# Patient Record
Sex: FEMALE | Race: WHITE
[De-identification: names, ages, dates, MRNs, and addresses within clinical notes are randomized per-mention and may not be internally consistent; named-entity substitution may affect disease eponyms.]

---

## 2021-12-06 ENCOUNTER — HOSPITAL ENCOUNTER (OUTPATIENT)
Dept: HOSPITAL 75 - RAD FS | Age: 76
End: 2021-12-06
Attending: FAMILY MEDICINE
Payer: MEDICARE

## 2021-12-06 DIAGNOSIS — M47.26: Primary | ICD-10-CM

## 2021-12-06 PROCEDURE — 72100 X-RAY EXAM L-S SPINE 2/3 VWS: CPT

## 2021-12-06 NOTE — DIAGNOSTIC IMAGING REPORT
Indication: Low back pain.



TIME OF EXAM: 2:31 PM



3 views lumbar spine show normal curvature and alignment.

Vertebral body heights are maintained. No acute compression

fracture seen. There is some generalized degenerative disc

disease with variable disc space narrowing and marginal spurring.

There is lower lumbar facet arthropathy. Atherosclerotic

calcifications in the abdominal aorta are noted.



IMPRESSION: Number spondylosis. No acute bony abnormality is

detected.



Dictated by: 



  Dictated on workstation # [id number]

## 2021-12-20 ENCOUNTER — HOSPITAL ENCOUNTER (OUTPATIENT)
Dept: HOSPITAL 75 - RAD | Age: 76
End: 2021-12-20
Attending: FAMILY MEDICINE
Payer: MEDICARE

## 2021-12-20 DIAGNOSIS — M48.07: ICD-10-CM

## 2021-12-20 DIAGNOSIS — M48.061: ICD-10-CM

## 2021-12-20 DIAGNOSIS — M47.817: ICD-10-CM

## 2021-12-20 DIAGNOSIS — M43.17: ICD-10-CM

## 2021-12-20 DIAGNOSIS — M51.16: ICD-10-CM

## 2021-12-20 DIAGNOSIS — M51.27: ICD-10-CM

## 2021-12-20 DIAGNOSIS — M47.26: Primary | ICD-10-CM

## 2021-12-20 PROCEDURE — 72148 MRI LUMBAR SPINE W/O DYE: CPT

## 2021-12-20 NOTE — DIAGNOSTIC IMAGING REPORT
PROCEDURE: MRI lumbar spine.



TECHNIQUE: Multiplanar, multisequence MRI of the lumbar spine was

performed without contrast.



INDICATION: Back pain with radiculopathy.



FINDINGS: There is minimal anterolisthesis of L5 on S1. The

alignment of the lumbosacral spine is normal. Body heights are

well maintained. Marrow signal is normal. The conus medullaris

and cauda equina are normal.



L1-L2: Disc shows normal contour. No facet or ligamentous

hypertrophy.



L2-L3: Desiccation of the disc with some loss of disc space

height. There is broad-based disc protrusion with facet and

ligamentous hypertrophy causing moderate encroachment upon the

foramen bilaterally. No central canal stenosis.



L3-L4: There is broad-based disc bulge with moderate facet and

ligamentous hypertrophy causing moderate bilateral foraminal

encroachment without central stenosis.



L4-L5: There is desiccation of the disc with minimal disc bulge.

There is considerable facet and ligamentous hypertrophy causing

moderate trefoil stenosis and encroachment upon the lateral

recesses. The residual AP central canal dimension is

approximately 6 mm.



L5-S1: Minimal anterolisthesis of L5 on S1 with very mild disc

bulge. Mild facet and ligamentous hypertrophy causing mild

foraminal encroachment bilaterally without central canal

stenosis. The paraspinal soft tissues appear normal.



IMPRESSION:

1. Multilevel degenerative disc and facet disease causing

mild-to-moderate encroachment as described above.



Dictated by: 



  Dictated on workstation # [id number]

## 2023-02-28 ENCOUNTER — HOSPITAL ENCOUNTER (OUTPATIENT)
Dept: HOSPITAL 75 - ER FS | Age: 78
Setting detail: OBSERVATION
LOS: 2 days | Discharge: ANOTHER HOSPITAL | End: 2023-03-02
Attending: INTERNAL MEDICINE | Admitting: FAMILY MEDICINE
Payer: MEDICARE

## 2023-02-28 VITALS — HEIGHT: 62.99 IN | BODY MASS INDEX: 28.52 KG/M2 | WEIGHT: 160.94 LBS

## 2023-02-28 VITALS — SYSTOLIC BLOOD PRESSURE: 159 MMHG | DIASTOLIC BLOOD PRESSURE: 80 MMHG

## 2023-02-28 VITALS — SYSTOLIC BLOOD PRESSURE: 134 MMHG | DIASTOLIC BLOOD PRESSURE: 59 MMHG

## 2023-02-28 VITALS — SYSTOLIC BLOOD PRESSURE: 139 MMHG | DIASTOLIC BLOOD PRESSURE: 77 MMHG

## 2023-02-28 VITALS — SYSTOLIC BLOOD PRESSURE: 143 MMHG | DIASTOLIC BLOOD PRESSURE: 70 MMHG

## 2023-02-28 VITALS — DIASTOLIC BLOOD PRESSURE: 84 MMHG | SYSTOLIC BLOOD PRESSURE: 139 MMHG

## 2023-02-28 VITALS — DIASTOLIC BLOOD PRESSURE: 81 MMHG | SYSTOLIC BLOOD PRESSURE: 151 MMHG

## 2023-02-28 VITALS — SYSTOLIC BLOOD PRESSURE: 143 MMHG | DIASTOLIC BLOOD PRESSURE: 90 MMHG

## 2023-02-28 VITALS — SYSTOLIC BLOOD PRESSURE: 147 MMHG | DIASTOLIC BLOOD PRESSURE: 68 MMHG

## 2023-02-28 DIAGNOSIS — E78.5: ICD-10-CM

## 2023-02-28 DIAGNOSIS — Z79.899: ICD-10-CM

## 2023-02-28 DIAGNOSIS — I10: ICD-10-CM

## 2023-02-28 DIAGNOSIS — E87.29: ICD-10-CM

## 2023-02-28 DIAGNOSIS — C92.00: Primary | ICD-10-CM

## 2023-02-28 DIAGNOSIS — E11.9: ICD-10-CM

## 2023-02-28 DIAGNOSIS — E87.5: ICD-10-CM

## 2023-02-28 DIAGNOSIS — G47.33: ICD-10-CM

## 2023-02-28 DIAGNOSIS — G45.9: ICD-10-CM

## 2023-02-28 DIAGNOSIS — N39.0: ICD-10-CM

## 2023-02-28 DIAGNOSIS — D69.6: ICD-10-CM

## 2023-02-28 DIAGNOSIS — I26.99: ICD-10-CM

## 2023-02-28 DIAGNOSIS — R77.8: ICD-10-CM

## 2023-02-28 DIAGNOSIS — E04.2: ICD-10-CM

## 2023-02-28 LAB
ALBUMIN SERPL-MCNC: 4.5 GM/DL (ref 3.2–4.5)
ALP SERPL-CCNC: 82 U/L (ref 40–136)
ALT SERPL-CCNC: 12 U/L (ref 0–55)
APTT BLD: 28 SEC (ref 24–35)
APTT PPP: YELLOW S
BACTERIA #/AREA URNS HPF: (no result) /HPF
BASOPHILS # BLD AUTO: 0.1 10^3/UL (ref 0–0.1)
BASOPHILS NFR BLD AUTO: 0 % (ref 0–10)
BASOPHILS NFR BLD MANUAL: 1 %
BILIRUB SERPL-MCNC: 0.3 MG/DL (ref 0.1–1)
BILIRUB UR QL STRIP: NEGATIVE
BUN/CREAT SERPL: 19
CALCIUM SERPL-MCNC: 9.5 MG/DL (ref 8.5–10.1)
CHLORIDE SERPL-SCNC: 102 MMOL/L (ref 98–107)
CO2 SERPL-SCNC: 23 MMOL/L (ref 21–32)
CREAT SERPL-MCNC: 1.27 MG/DL (ref 0.6–1.3)
D DIMER PPP FEU-MCNC: > 20 UG/ML (ref 0–0.49)
EOSINOPHIL # BLD AUTO: 0 10^3/UL (ref 0–0.3)
EOSINOPHIL NFR BLD AUTO: 0 % (ref 0–10)
EOSINOPHIL NFR BLD MANUAL: 0 %
FIBRINOGEN PPP-MCNC: (no result) MG/DL
GFR SERPLBLD BASED ON 1.73 SQ M-ARVRAT: 43 ML/MIN
GLUCOSE SERPL-MCNC: 152 MG/DL (ref 70–105)
GLUCOSE UR STRIP-MCNC: NEGATIVE MG/DL
HCT VFR BLD CALC: 37 % (ref 35–52)
HGB BLD-MCNC: 12.3 G/DL (ref 11.5–16)
INR PPP: 1.1 (ref 0.8–1.4)
KETONES UR QL STRIP: NEGATIVE
LEUKOCYTE ESTERASE UR QL STRIP: (no result)
LYMPHOCYTES # BLD AUTO: 11.6 10^3/UL (ref 1–4)
LYMPHOCYTES NFR BLD AUTO: 48 % (ref 12–44)
MAGNESIUM SERPL-MCNC: 1.7 MG/DL (ref 1.6–2.4)
MANUAL DIFFERENTIAL PERFORMED BLD QL: YES
MCH RBC QN AUTO: 28 PG (ref 25–34)
MCHC RBC AUTO-ENTMCNC: 33 G/DL (ref 32–36)
MCV RBC AUTO: 86 FL (ref 80–99)
METAMYELOCYTES NFR BLD: 1 %
MONOCYTES # BLD AUTO: 10.7 10^3/UL (ref 0–1)
MONOCYTES NFR BLD AUTO: 44 % (ref 0–12)
MONOCYTES NFR BLD: 15 %
NEUTROPHILS # BLD AUTO: 1.3 10^3/UL (ref 1.8–7.8)
NEUTROPHILS NFR BLD AUTO: 5 % (ref 42–75)
NEUTS BAND NFR BLD MANUAL: 4 %
NEUTS BAND NFR BLD: 1 %
NITRITE UR QL STRIP: POSITIVE
NRBC BLD MANUAL-RTO: 1 %
PH UR STRIP: 5.5 [PH] (ref 5–9)
PLATELET # BLD: 62 10^3/UL (ref 130–400)
PMV BLD AUTO: 10.3 FL (ref 9–12.2)
POTASSIUM SERPL-SCNC: 3.4 MMOL/L (ref 3.6–5)
PROT SERPL-MCNC: 7.8 GM/DL (ref 6.4–8.2)
PROT UR QL STRIP: (no result)
PROTHROMBIN TIME: 14.7 SEC (ref 12.2–14.7)
RBC #/AREA URNS HPF: (no result) /HPF
SODIUM SERPL-SCNC: 141 MMOL/L (ref 135–145)
SP GR UR STRIP: 1.01 (ref 1.02–1.02)
SQUAMOUS #/AREA URNS HPF: (no result) /HPF
VARIANT LYMPHS NFR BLD MANUAL: 70 %
VARIANT LYMPHS NFR BLD MANUAL: 8 %
WBC # BLD AUTO: 24.1 10^3/UL (ref 4.3–11)
WBC #/AREA URNS HPF: (no result) /HPF

## 2023-02-28 PROCEDURE — 85379 FIBRIN DEGRADATION QUANT: CPT

## 2023-02-28 PROCEDURE — 85610 PROTHROMBIN TIME: CPT

## 2023-02-28 PROCEDURE — 87636 SARSCOV2 & INF A&B AMP PRB: CPT

## 2023-02-28 PROCEDURE — 85730 THROMBOPLASTIN TIME PARTIAL: CPT

## 2023-02-28 PROCEDURE — 82947 ASSAY GLUCOSE BLOOD QUANT: CPT

## 2023-02-28 PROCEDURE — 93970 EXTREMITY STUDY: CPT

## 2023-02-28 PROCEDURE — 80053 COMPREHEN METABOLIC PANEL: CPT

## 2023-02-28 PROCEDURE — 80048 BASIC METABOLIC PNL TOTAL CA: CPT

## 2023-02-28 PROCEDURE — 83735 ASSAY OF MAGNESIUM: CPT

## 2023-02-28 PROCEDURE — 96374 THER/PROPH/DIAG INJ IV PUSH: CPT

## 2023-02-28 PROCEDURE — 70450 CT HEAD/BRAIN W/O DYE: CPT

## 2023-02-28 PROCEDURE — 85045 AUTOMATED RETICULOCYTE COUNT: CPT

## 2023-02-28 PROCEDURE — 94760 N-INVAS EAR/PLS OXIMETRY 1: CPT

## 2023-02-28 PROCEDURE — 83605 ASSAY OF LACTIC ACID: CPT

## 2023-02-28 PROCEDURE — 85025 COMPLETE CBC W/AUTO DIFF WBC: CPT

## 2023-02-28 PROCEDURE — 99284 EMERGENCY DEPT VISIT MOD MDM: CPT

## 2023-02-28 PROCEDURE — 96372 THER/PROPH/DIAG INJ SC/IM: CPT

## 2023-02-28 PROCEDURE — 96375 TX/PRO/DX INJ NEW DRUG ADDON: CPT

## 2023-02-28 PROCEDURE — 85027 COMPLETE CBC AUTOMATED: CPT

## 2023-02-28 PROCEDURE — 87186 SC STD MICRODIL/AGAR DIL: CPT

## 2023-02-28 PROCEDURE — 85007 BL SMEAR W/DIFF WBC COUNT: CPT

## 2023-02-28 PROCEDURE — 71275 CT ANGIOGRAPHY CHEST: CPT

## 2023-02-28 PROCEDURE — 70496 CT ANGIOGRAPHY HEAD: CPT

## 2023-02-28 PROCEDURE — 87088 URINE BACTERIA CULTURE: CPT

## 2023-02-28 PROCEDURE — 93306 TTE W/DOPPLER COMPLETE: CPT

## 2023-02-28 PROCEDURE — 36415 COLL VENOUS BLD VENIPUNCTURE: CPT

## 2023-02-28 PROCEDURE — 70498 CT ANGIOGRAPHY NECK: CPT

## 2023-02-28 PROCEDURE — 96376 TX/PRO/DX INJ SAME DRUG ADON: CPT

## 2023-02-28 PROCEDURE — 84550 ASSAY OF BLOOD/URIC ACID: CPT

## 2023-02-28 PROCEDURE — 87077 CULTURE AEROBIC IDENTIFY: CPT

## 2023-02-28 PROCEDURE — 71045 X-RAY EXAM CHEST 1 VIEW: CPT

## 2023-02-28 PROCEDURE — 81000 URINALYSIS NONAUTO W/SCOPE: CPT

## 2023-02-28 PROCEDURE — 85055 RETICULATED PLATELET ASSAY: CPT

## 2023-02-28 PROCEDURE — 84484 ASSAY OF TROPONIN QUANT: CPT

## 2023-02-28 PROCEDURE — 93005 ELECTROCARDIOGRAM TRACING: CPT

## 2023-02-28 RX ADMIN — Medication SCH ML: at 22:41

## 2023-02-28 NOTE — DIAGNOSTIC IMAGING REPORT
PROCEDURE: CT angiography of the head and CT angiography of the

neck with and without contrast.



TECHNIQUE: Contiguous noncontrast images were obtained from the

skull base through the vertex. After intravenous contrast

administration, helical CT angiography of the neck was performed.

Source data was reformatted into 3D MIP projections. Delayed post

contrast acquisition was also obtained. Auto Exposure Controls

were utilized during the CT exam to meet ALARA standards for

radiation dose reduction. 



INDICATION: Right-sided weakness.



CTA NECK:



There is a three-vessel branching pattern to the aortic arch. The

right and left common carotid arteries are widely patent. No

focal stenosis is identified. Both carotid bifurcations are

unremarkable and show no focal stenosis. The right and left

internal carotid arteries appear to be widely patent. There is

some calcified plaque at the carotid siphons bilaterally. The

left vertebral artery is dominant. Both vertebral arteries are

widely patent.



CTA HEAD:



The basilar artery is widely patent. The right posterior cerebral

artery is hypoplastic consistent with fetal origin. The right

posterior communicating artery feeds the right PCA. The left PCA

is widely patent. The right and left anterior cerebral arteries

are widely patent. The M1 and M2 branches of the right and left

middle cervical arteries appear to be widely patent. No

thromboembolism is identified. No large vessel occlusion is

detected.



Note is made of multiple low-attenuation nodules throughout both

lobes of the thyroid gland.



IMPRESSION:

1. Essentially unremarkable CT angiogram of the head and neck. No

thromboembolism or large vessel occlusion is identified. 

2. Bilateral thyroid nodules. This could be evaluated with

ultrasound on a nonemergent basis if not already performed.



Dictated by: 



  Dictated on workstation # [id number]

## 2023-02-28 NOTE — ED NEUROLOGICAL PROBLEM
General


Stated Complaint:  VISION DISTURBANCE/RIGHT HAND WEAKNESS





History of Present Illness


Date Seen by Provider:  2023


Time Seen by Provider:  15:31


Initial Comments


71-year-old female with PMH of DM 2/HTN/chronic UTIs/urinary incontinence, is 

here with complaints of right upper extremity weakness which occurred earlier 

today.  Patient's symptoms have resolved since then but she is not feeling well.

 Patient cannot explain how she feels except to say that she does not feel well 

and has been having shortness of breath which is worsened on exertion, and 

worsening over the past week and a half.  Patient noticed that she had urinary 

retention today.  Patient also complains that she has had left upper extremity 

weakness for the past week and a half, and it is not a result of any injuries or

falls.  Denies headache, blurry vision, chest pain, palpitations, abdominal 

pain, fever and chills.





Allergies and Home Medications


Allergies


Coded Allergies:  


     No Known Drug Allergies (Unverified , 23)





Patient Home Medication List


Home Medication List Reviewed:  Yes





Review of Systems


Review of Systems


Constitutional:  no symptoms reported


Eyes:  No Symptoms Reported


Ears, Nose, Mouth, Throat:  no symptoms reported


Respiratory:  no symptoms reported


Cardiovascular:  no symptoms reported


Gastrointestinal:  no symptoms reported


Genitourinary:  see HPI


Musculoskeletal:  no symptoms reported


Skin:  no symptoms reported


Psychiatric/Neurological:  See HPI, Numbness, Weakness


Endocrine:  No Symptoms Reported


Hematologic/Lymphatic:  No Symptoms Reported





Physical Exam


Vital Signs





Vital Signs - First Documented








 23





 15:30


 


Temp 36.5


 


Pulse 93


 


Resp 21


 


B/P (MAP) 200/90 (126)


 


Pulse Ox 96


 


O2 Delivery Room Air





Capillary Refill :


Height, Weight, BMI


Height: '"


Weight: lbs. oz. kg;  BMI


Method:


General Appearance:  WD/WN, no apparent distress


HEENT:  PERRL/EOMI


Neck:  non-tender, full range of motion, supple, normal inspection


Respiratory:  lungs clear, normal breath sounds


Cardiovascular:  regular rate, rhythm, no edema


Gastrointestinal:  normal bowel sounds, non tender, soft


Back:  normal inspection, no CVA tenderness, no vertebral tenderness


Extremities:  normal range of motion, non-tender, normal inspection, no pedal 

edema, no calf tenderness


Neurologic/Psychiatric:  CNs II-XII nml as tested, no motor/sensory deficits, 

alert, normal mood/affect, oriented x 3


Crainal Nerves:  normal hearing, normal speech, PERRL


Coordination/Gait:  normal finger to nose, normal gait


Motor/Sensory:  no motor deficit, no sensory deficit, no pronator drift, 

negative Babinski's sign


Reflexes:  4+ Bicep (R), 4+ Bicep (L), 4+ Tricep (L), 4+ Knee (R), 4+ Knee (L), 

4+ Ankle (R), 4+ Ankle (L)


Skin:  normal color


Lymphatic:  no adenopathy





Stroke


NIH Stroke Scale Assessment





   Select: Initial Level of Consciousness: 0=Alert (0), Level of 

   Consciousness-Questions: 0=Answers both month/age (0), LOC Commands: 

   0=Performs both tasks (0), Visual Fields: 0=No visual loss (0), Facial 

   Movement (Facial Paresis): 0=Normal symmetrical mnt (0), Motor Function-Arms 

   Right: 0=No drift (0), Motor Function-Arms Left: 0=No drift (0), Motor 

   Function-Legs Right: 0=No drift (0), Motor Function-Legs Left: 0=No drift 

   (0), Limb Ataxia: 0=Absent (0), Sensory: 0=Normal:no loss (0), Best Language:

   0=No aphasia (0), Dysarthria: 0=Normal (0), Extinction & Inattention: 0=No 

   abnormality (0), Total: 0





Focused Exam


Lactate Level


23 15:50: Lactic Acid Level 0.93





Lactic Acid Level





Laboratory Tests








Test


 23


15:50


 


Lactic Acid Level


 0.93 MMOL/L


(0.50-2.00)











Progress/Results/Core Measures


Results/Orders


Lab Results





Laboratory Tests








Test


 23


15:35 23


15:36 23


15:50 23


16:00 Range/Units


 


 


White Blood Count


 24.1 H


 


 


 


 4.3-11.0


10^3/uL


 


Red Blood Count


 4.37 


 


 


 


 3.80-5.11


10^6/uL


 


Hemoglobin 12.3     11.5-16.0  g/dL


 


Hematocrit 37     35-52  %


 


Mean Corpuscular Volume 86     80-99  fL


 


Mean Corpuscular Hemoglobin 28     25-34  pg


 


Mean Corpuscular Hemoglobin


Concent 33 


 


 


 


 32-36  g/dL





 


Red Cell Distribution Width 14.9 H    10.0-14.5  %


 


Platelet Count


 62 L


 


 


 


 130-400


10^3/uL


 


Mean Platelet Volume 10.3     9.0-12.2  fL


 


Immature Granulocyte % (Auto) 2      %


 


Neutrophils (%) (Auto) 5 L    42-75  %


 


Lymphocytes (%) (Auto) 48 H    12-44  %


 


Monocytes (%) (Auto) 44 H    0-12  %


 


Eosinophils (%) (Auto) 0     0-10  %


 


Basophils (%) (Auto) 0     0-10  %


 


Neutrophils # (Auto)


 1.3 L


 


 


 


 1.8-7.8


10^3/uL


 


Lymphocytes # (Auto)


 11.6 H


 


 


 


 1.0-4.0


10^3/uL


 


Monocytes # (Auto)


 10.7 H


 


 


 


 0.0-1.0


10^3/uL


 


Eosinophils # (Auto)


 0.0 


 


 


 


 0.0-0.3


10^3/uL


 


Basophils # (Auto)


 0.1 


 


 


 


 0.0-0.1


10^3/uL


 


Immature Granulocyte # (Auto)


 0.5 H


 


 


 


 0.0-0.1


10^3/uL


 


Neutrophils % (Manual) 4      %


 


Lymphocytes % (Manual) 70      %


 


Monocytes % (Manual) 15      %


 


Eosinophils % (Manual) 0      %


 


Basophils % (Manual) 1      %


 


Metamyelocytes % 1      %


 


Band Neutrophils 1      %


 


Nucleated Red Blood Cells 1      


 


Atypical Lymphocytes 8      %


 


Prothrombin Time 14.7     12.2-14.7  SEC


 


INR Comment 1.1     0.8-1.4  


 


Activated Partial


Thromboplast Time 28 


 


 


 


 24-35  SEC





 


D-Dimer


 > 20.00 H


 


 


 


 0.00-0.49


UG/ML


 


Sodium Level 141     135-145  MMOL/L


 


Potassium Level 3.4 L    3.6-5.0  MMOL/L


 


Chloride Level 102       MMOL/L


 


Carbon Dioxide Level 23     21-32  MMOL/L


 


Anion Gap 16 H    5-14  MMOL/L


 


Blood Urea Nitrogen 24 H    7-18  MG/DL


 


Creatinine


 1.27 


 


 


 


 0.60-1.30


MG/DL


 


Estimat Glomerular Filtration


Rate 43 


 


 


 


  





 


BUN/Creatinine Ratio 19      


 


Glucose Level 152 H      MG/DL


 


Calcium Level 9.5     8.5-10.1  MG/DL


 


Corrected Calcium 9.1     8.5-10.1  MG/DL


 


Magnesium Level 1.7     1.6-2.4  MG/DL


 


Total Bilirubin 0.3     0.1-1.0  MG/DL


 


Aspartate Amino Transf


(AST/SGOT) 22 


 


 


 


 5-34  U/L





 


Alanine Aminotransferase


(ALT/SGPT) 12 


 


 


 


 0-55  U/L





 


Alkaline Phosphatase 82       U/L


 


Troponin I 0.72 *H    <0.30  NG/ML


 


Total Protein 7.8     6.4-8.2  GM/DL


 


Albumin 4.5     3.2-4.5  GM/DL


 


Glucometer  140 H     MG/DL


 


Lactic Acid Level


 


 


 0.93 


 


 0.50-2.00


MMOL/L


 


Influenza Type A (RT-PCR)    Not Detected  Not Detecte  


 


Influenza Type B (RT-PCR)    Not Detected  Not Detecte  


 


SARS-CoV-2 RNA (RT-PCR)    Not Detected  Not Detecte  


 


Test


 23


17:05 23


17:55 


 


 Range/Units


 


 


Urine Color YELLOW      


 


Urine Clarity SL CLOUDY      


 


Urine pH 5.5     5-9  


 


Urine Specific Gravity 1.010 L    1.016-1.022  


 


Urine Protein 1+ H    NEGATIVE  


 


Urine Glucose (UA) NEGATIVE     NEGATIVE  


 


Urine Ketones NEGATIVE     NEGATIVE  


 


Urine Nitrite POSITIVE H    NEGATIVE  


 


Urine Bilirubin NEGATIVE     NEGATIVE  


 


Urine Urobilinogen 0.2     < = 1.0  MG/DL


 


Urine Leukocyte Esterase TRACE H    NEGATIVE  


 


Urine RBC (Auto) 1+ H    NEGATIVE  


 


Urine RBC NONE      /HPF


 


Urine WBC 2-5      /HPF


 


Urine Squamous Epithelial


Cells 5-10 


 


 


 


  /HPF





 


Urine Crystals NONE      /LPF


 


Urine Bacteria MODERATE H     /HPF


 


Urine Casts NONE      /LPF


 


Urine Mucus NEGATIVE      /LPF


 


Urine Culture Indicated YES      


 


Troponin I  1.04 *H   <0.30  NG/ML








My Orders





Orders - JULES REHMAN MD


Ct Head Wo-R/O Stroke (23 15:32)


Chest 1 View Ap/Pa Only (23 15:33)


Cbc With Automated Diff (23 15:40)


Comprehensive Metabolic Panel (23 15:40)


Fibrin Degradation Products (23 15:40)


Lactic Acid Analyzer (23 15:40)


Magnesium (23 15:40)


Protime With Inr (23 15:40)


Partial Thromboplastin Time (23 15:40)


Ua Culture If Indicated (23 15:40)


Troponin I Fs (23 15:40)


Covid 19 Inhouse Test (23 15:42)


Influenza A And B By Pcr (23 15:42)


Manual Differential (23 15:35)


Continuous Ekg Monitoring (23 16:12)


Ekg Tracing (23 16:12)


Ct Angio Head/Neck (23 16:13)


Iohexol Injection (Omnipaque 350 Mg/Ml 1 (23 16:45)


Received Contrast (Hold Metformin- Contr (23 16:45)


Ns (Ivpb) (Sodium Chloride 0.9% Ivpb Bag (23 16:45)


Ct Angio Chest W (23 17:04)


Troponin I Fs (23 17:05)


Ekg Tracing (23 17:05)


Iohexol Injection (Omnipaque 350 Mg/Ml 1 (23 17:15)


Received Contrast (Hold Metformin- Contr (23 17:15)


Ns (Ivpb) (Sodium Chloride 0.9% Ivpb Bag (23 17:15)


Ed Iv/Invasive Line Start (23 17:22)


Ns Iv 1000 Ml (Sodium Chloride 0.9%) (23 17:30)


Urine Culture (23 17:05)


Ceftriaxone 1 Gm Pre-Mix (Rocephin 1 Gm (23 17:34)


Heparin (Bolus Per Protocol) (Heparin (B (23 18:15)


Enoxaparin Injection (Lovenox Injection) (23 18:12)





Medications Given in ED





Current Medications








 Medications  Dose


 Ordered  Sig/Krishna


 Route  Start Time


 Stop Time Status Last Admin


Dose Admin


 


 Iohexol  100 ml  ONCE  ONCE


 IV  23 16:45


 23 16:46 DC 23 16:53


75 ML


 


 Iohexol  100 ml  ONCE  ONCE


 IV  23 17:15


 23 17:32 DC 23 17:42


80 ML


 


 Sodium Chloride  100 ml  ONCE  ONCE


 IV  23 16:45


 23 16:46 DC 23 16:53


100 ML


 


 Sodium Chloride  100 ml  ONCE  ONCE


 IV  23 17:15


 2/28/23 17:32 DC 23 17:41


100 ML








Vital Signs/I&O











 23





 15:30


 


Temp 36.5


 


Pulse 93


 


Resp 21


 


B/P (MAP) 200/90 (126)


 


Pulse Ox 96


 


O2 Delivery Room Air











Progress


Progress Note :  


Progress Note


1. STROKE RULE OUT:


- CT HEAD: no acute findings


- CTA HEAD & NECK: No acute findings


- Labs: see chart and note


-Symptoms likely due to UTI


2. BILATERAL PULMONARY EMBOLISM


- Troponin: 0.72, 2nd tropis 1.04; likely due to PE and demand ischemia. Pt did 

not have chest pain at any time.


- EKG x2: non-ischemic


- Platelet count is 62, pt is not on any blood thinners at home


- D-dimer: >20


- CTA CHEST: Bilateral pulmonary embolism


-Patient's vitals are stable in the ER and she is satting at 97% on room air 

without any need for oxygen.


-Discussed with hospitalist, Dr. Dias, and accepted for observation admission 

to cardiac stepdown.  Discussed anticoagulation and since platelet count is 62, 

will start patient on treatment dose of Lovenox


3. ACUTE CYSTITIS WITH HEMATURIA :


- UA is positive for nitrites and leukocyte esterase and RBCs


-WBC is elevated at 24


- Lactic Acid level was normal indicating normal tissue perfusion.


-Ceftriaxone 1 g IV stat


Initial ECG Impression Date:  2023


Initial ECG Impression Time:  16:15


Initial ECG Rate:  86


Initial ECG Rhythm:  Normal Sinus


Initial ECG Intervals:  Normal


Initial ECG Impression:  Nonspecific Changes





Diagnostic Imaging





   Diagonstic Imaging:  CT


   Plain Films/CT/US/NM/MRI:  chest, head


Comments


                 [facility] [place], [place]





NAME:   AD KRUEGER


[facility] REC#:   [id number]


ACCOUNT#:   [id number]


PT STATUS:   REG ER


:   1945


PHYSICIAN:   JULES REHMAN MD


ADMIT DATE:   23/ER FS


                                   ***Draft***


Date of Exam:23





CT ANGIO HEAD/NECK








PROCEDURE: CT angiography of the head and CT angiography of the


neck with and without contrast.





TECHNIQUE: Contiguous noncontrast images were obtained from the


skull base through the vertex. After intravenous contrast


administration, helical CT angiography of the neck was performed.


Source data was reformatted into 3D MIP projections. Delayed post


contrast acquisition was also obtained. Auto Exposure Controls


were utilized during the CT exam to meet ALARA standards for


radiation dose reduction. 





INDICATION: Right-sided weakness.





CTA NECK:





There is a three-vessel branching pattern to the aortic arch. The


right and left common carotid arteries are widely patent. No


focal stenosis is identified. Both carotid bifurcations are


unremarkable and show no focal stenosis. The right and left


internal carotid arteries appear to be widely patent. There is


some calcified plaque at the carotid siphons bilaterally. The


left vertebral artery is dominant. Both vertebral arteries are


widely patent.





CTA HEAD:





The basilar artery is widely patent. The right posterior cerebral


artery is hypoplastic consistent with fetal origin. The right


posterior communicating artery feeds the right PCA. The left PCA


is widely patent. The right and left anterior cerebral arteries


are widely patent. The M1 and M2 branches of the right and left


middle cervical arteries appear to be widely patent. No


thromboembolism is identified. No large vessel occlusion is


detected.





Note is made of multiple low-attenuation nodules throughout both


lobes of the thyroid gland.





IMPRESSION:


1. Essentially unremarkable CT angiogram of the head and neck. No


thromboembolism or large vessel occlusion is identified. 


2. Bilateral thyroid nodules. This could be evaluated with


ultrasound on a nonemergent basis if not already performed.





  Dictated on workstation # [id number]








Dict:   235


Trans:   23


[facility] [id number]





Interpreted by:     POONAM ROMAN MD


Electronically signed by:  


[facility] [place], [place]





NAME:   AD KRUEGER


[facility] REC#:   [id number]


ACCOUNT#:   [id number]


PT STATUS:   REG ER


:   1945


PHYSICIAN:   JULES REHMAN MD


ADMIT DATE:   23/ER FS


                                   ***Draft***


Date of Exam:23





CT ANGIO CHEST W








PROCEDURE: CT angiography of the chest with contrast.





TECHNIQUE: Multiple contiguous axial images were obtained through


the chest after uneventful bolus administration of intravenous


contrast. 3D reconstructed CTA MIP acquisitions were also


performed. Auto Exposure Controls were utilized during the CT


exam to meet ALARA standards for radiation dose reduction. 





INDICATION: Elevated D-dimer.





No prior studies are available for comparison.





FINDINGS: Findings are positive for pulmonary emboli. There are


some filling defects involving a right upper lobe lobar pulmonary


arterial branch as well as right lower lobe lobar and segmental


branches. There is a tiny filling defect in a left lower lobe


segmental branch. Upper lobes are unremarkable. Overall clot


burden is very small. No definite findings of right heart failure


are detected. Thoracic aorta is normal in caliber. There is no


dissection. There is no pericardial or pleural fluid identified.


Lungs appear to be clear. No infiltrates are seen. There is no


nodule or mass. The upper abdomen does show low-attenuation


within the right lobe of the liver, suggestive of a cyst.





IMPRESSION:


1. Findings consistent with bilateral pulmonary emboli. Overall


clot burden is very small. There is no evidence of right heart


failure.





  Dictated on workstation # [id number]








Dict:   23 173


Trans:   23


 [id number]





Interpreted by:     POONAM ROMAN MD


Electronically signed by:  





                 [facility] [place], [place]





NAME:   AD KRUEGER [facility] REC#:   [id number]


ACCOUNT#:   [id number]


PT STATUS:   REG ER


:   1945


PHYSICIAN:   JULES REHMAN MD


ADMIT DATE:   23/ER FS


                                  ***Signed***


Date of Exam:23





CHEST 1 VIEW AP/PA ONLY








INDICATION: Right upper extremity weakness





COMPARISON: None available. 





TECHNIQUE: Single radiograph of the chest dated 2023.





FINDINGS:  The cardiac silhouette is within normal limits in


size. No significant pulmonary vascular congestion. Senescent


changes of the lungs without focal pulmonary opacity. No


significant pleural effusion. No pneumothorax. No acute osseous


abnormality.





IMPRESSION: Senescent changes of the lungs without superimposed


acute cardiopulmonary abnormality.





Dictated by: 





  Dictated on workstation # [id number]








Dict:   23 1620


Trans:   23


[facility] [id number]





Interpreted by:     VIOLETTE KHOURY MD


Electronically signed by: VIOLETTE KHOURY MD 23


                 [facility] [place], [place]





NAME:   AD KRUEGER [facility] REC#:   [id number]


ACCOUNT#:   [id number]


PT STATUS:   REG ER


:   1945


PHYSICIAN:   JULES REHMAN MD


ADMIT DATE:   23/ER FS


                                   ***Draft***


Date of Exam:23





CT HEAD WO-R/O STROKE








PROCEDURE: CT head wo r/o stroke.





TECHNIQUE: Multiple contiguous axial images were obtained through


the brain without the use of intravenous contrast. Auto Exposure


Controls were utilized during the CT exam to meet ALARA standards


for radiation dose reduction.





INDICATION: Blurred vision and weakness.





COMPARISON: No prior studies are available for comparison.





FINDINGS: Ventricles and sulci are within normal limits. No


sulcal effacement or midline shift is identified. No acute


intra-axial or extra-axial hemorrhage is detected. Cisterns are


patent. Visualized paranasal sinuses are clear.





IMPRESSION: No acute intracranial process is detected.





Results were discussed with Dr. Rehman at 04:03 p.m.





  Dictated on workstation # [id number]








Dict:   23 1601


Trans:   23 [id number]


[id number] [id number]





Interpreted by:     POONAM ROMAN MD


Electronically signed by:





Departure


Communication (Admissions)


Time/Spoke to Admitting Phy:  18:20


Discussed with hospitalist and accepted for admission to stepdown unit for 

observation





Impression





   Primary Impression:  


   Bilateral pulmonary embolism


   Additional Impression:  


   Acute cystitis with hematuria


Disposition:  30 STILL A PATIENT


Condition:  Stable





Admissions


Decision to Admit Reason:  Admit from ER (General)


Decision to Admit/Date:  2023


Time/Decision to Admit Time:  18:00





Transfer


Method of Transfer:  EMS





Departure-Patient Inst.


Referrals:  


SUKHDEEP MINOR MD (PCP/Family)


Primary Care Physician











JULES REHMAN MD              2023 15:32

## 2023-02-28 NOTE — DIAGNOSTIC IMAGING REPORT
INDICATION: Right upper extremity weakness



COMPARISON: None available. 



TECHNIQUE: Single radiograph of the chest dated 02/22/2023.



FINDINGS:  The cardiac silhouette is within normal limits in

size. No significant pulmonary vascular congestion. Senescent

changes of the lungs without focal pulmonary opacity. No

significant pleural effusion. No pneumothorax. No acute osseous

abnormality.



IMPRESSION: Senescent changes of the lungs without superimposed

acute cardiopulmonary abnormality.



Dictated by: 



  Dictated on workstation # [id number]

## 2023-02-28 NOTE — DIAGNOSTIC IMAGING REPORT
PROCEDURE: CT head wo r/o stroke.



TECHNIQUE: Multiple contiguous axial images were obtained through

the brain without the use of intravenous contrast. Auto Exposure

Controls were utilized during the CT exam to meet ALARA standards

for radiation dose reduction.



INDICATION: Blurred vision and weakness.



COMPARISON: No prior studies are available for comparison.



FINDINGS: Ventricles and sulci are within normal limits. No

sulcal effacement or midline shift is identified. No acute

intra-axial or extra-axial hemorrhage is detected. Cisterns are

patent. Visualized paranasal sinuses are clear.



IMPRESSION: No acute intracranial process is detected.



Results were discussed with Dr. Rehman at 04:03 p.m.



Dictated by: 



  Dictated on workstation # [id number]

## 2023-02-28 NOTE — DIAGNOSTIC IMAGING REPORT
PROCEDURE: CT angiography of the chest with contrast.



TECHNIQUE: Multiple contiguous axial images were obtained through

the chest after uneventful bolus administration of intravenous

contrast. 3D reconstructed CTA MIP acquisitions were also

performed. Auto Exposure Controls were utilized during the CT

exam to meet ALARA standards for radiation dose reduction. 



INDICATION: Elevated D-dimer.



No prior studies are available for comparison.



FINDINGS: Findings are positive for pulmonary emboli. There are

some filling defects involving a right upper lobe lobar pulmonary

arterial branch as well as right lower lobe lobar and segmental

branches. There is a tiny filling defect in a left lower lobe

segmental branch. Upper lobes are unremarkable. Overall clot

burden is very small. No definite findings of right heart failure

are detected. Thoracic aorta is normal in caliber. There is no

dissection. There is no pericardial or pleural fluid identified.

Lungs appear to be clear. No infiltrates are seen. There is no

nodule or mass. The upper abdomen does show low-attenuation

within the right lobe of the liver, suggestive of a cyst.



IMPRESSION:

1. Findings consistent with bilateral pulmonary emboli. Overall

clot burden is very small. There is no evidence of right heart

failure.



Dictated by: 



  Dictated on workstation # [id number]

## 2023-03-01 VITALS — DIASTOLIC BLOOD PRESSURE: 77 MMHG | SYSTOLIC BLOOD PRESSURE: 145 MMHG

## 2023-03-01 VITALS — SYSTOLIC BLOOD PRESSURE: 151 MMHG | DIASTOLIC BLOOD PRESSURE: 78 MMHG

## 2023-03-01 VITALS — SYSTOLIC BLOOD PRESSURE: 159 MMHG | DIASTOLIC BLOOD PRESSURE: 82 MMHG

## 2023-03-01 VITALS — SYSTOLIC BLOOD PRESSURE: 141 MMHG | DIASTOLIC BLOOD PRESSURE: 83 MMHG

## 2023-03-01 VITALS — DIASTOLIC BLOOD PRESSURE: 72 MMHG | SYSTOLIC BLOOD PRESSURE: 158 MMHG

## 2023-03-01 VITALS — SYSTOLIC BLOOD PRESSURE: 161 MMHG | DIASTOLIC BLOOD PRESSURE: 78 MMHG

## 2023-03-01 VITALS — DIASTOLIC BLOOD PRESSURE: 89 MMHG | SYSTOLIC BLOOD PRESSURE: 153 MMHG

## 2023-03-01 LAB
APTT BLD: 36 SEC (ref 24–35)
BASOPHILS # BLD AUTO: 0.1 10^3/UL (ref 0–0.1)
BASOPHILS NFR BLD AUTO: 0 % (ref 0–10)
BLASTS NFR BLD: 57 %
BUN/CREAT SERPL: 18
BURR CELLS BLD QL SMEAR: SLIGHT
CALCIUM SERPL-MCNC: 8.8 MG/DL (ref 8.5–10.1)
CHLORIDE SERPL-SCNC: 108 MMOL/L (ref 98–107)
CO2 SERPL-SCNC: 20 MMOL/L (ref 21–32)
CREAT SERPL-MCNC: 0.77 MG/DL (ref 0.6–1.3)
EOSINOPHIL # BLD AUTO: 0 10^3/UL (ref 0–0.3)
EOSINOPHIL NFR BLD AUTO: 0 % (ref 0–10)
GFR SERPLBLD BASED ON 1.73 SQ M-ARVRAT: 78 ML/MIN
GLUCOSE SERPL-MCNC: 128 MG/DL (ref 70–105)
HCT VFR BLD CALC: 36 % (ref 35–52)
HGB BLD-MCNC: 11.8 G/DL (ref 11.5–16)
INR PPP: 1.2 (ref 0.8–1.4)
LYMPHOCYTES # BLD AUTO: 6.6 10^3/UL (ref 1–4)
LYMPHOCYTES NFR BLD AUTO: 31 % (ref 12–44)
MANUAL DIFFERENTIAL PERFORMED BLD QL: NO
MCH RBC QN AUTO: 28 PG (ref 25–34)
MCHC RBC AUTO-ENTMCNC: 33 G/DL (ref 32–36)
MCV RBC AUTO: 86 FL (ref 80–99)
METAMYELOCYTES NFR BLD: 3 %
MONOCYTES # BLD AUTO: 12.4 10^3/UL (ref 0–1)
MONOCYTES NFR BLD AUTO: 58 % (ref 0–12)
MONOCYTES NFR BLD: 6 %
NEUTROPHILS # BLD AUTO: 1.8 10^3/UL (ref 1.8–7.8)
NEUTROPHILS NFR BLD AUTO: 8 % (ref 42–75)
NEUTS BAND NFR BLD MANUAL: 4 %
NEUTS BAND NFR BLD: 1 %
NRBC BLD MANUAL-RTO: 3 %
PLATELET # BLD EST: (no result) 10*3/UL
PLATELET # BLD: 58 10^3/UL (ref 130–400)
PLATELET CLUMP BLD QL SMEAR: (no result)
PMV BLD AUTO: 9.5 FL (ref 9–12.2)
POIKILOCYTOSIS BLD QL SMEAR: SLIGHT
POTASSIUM SERPL-SCNC: 3.1 MMOL/L (ref 3.6–5)
PROTHROMBIN TIME: 15.9 SEC (ref 12.2–14.7)
RETICS #: 36 10E9/UL (ref 24–90)
RETICS/RBC NFR: 0.87 % (ref 0.5–2.4)
SODIUM SERPL-SCNC: 142 MMOL/L (ref 135–145)
VARIANT LYMPHS NFR BLD MANUAL: 29 %
WBC # BLD AUTO: 21.3 10^3/UL (ref 4.3–11)

## 2023-03-01 RX ADMIN — POTASSIUM CHLORIDE SCH MLS/HR: 200 INJECTION, SOLUTION INTRAVENOUS at 08:45

## 2023-03-01 RX ADMIN — CARVEDILOL SCH EA: 25 TABLET, FILM COATED ORAL at 17:54

## 2023-03-01 RX ADMIN — Medication SCH ML: at 22:02

## 2023-03-01 RX ADMIN — METHENAMINE HIPPURATE SCH GM: 1 TABLET ORAL at 21:54

## 2023-03-01 RX ADMIN — POTASSIUM CHLORIDE SCH MLS/HR: 200 INJECTION, SOLUTION INTRAVENOUS at 10:54

## 2023-03-01 RX ADMIN — ASPIRIN SCH MG: 325 TABLET ORAL at 08:45

## 2023-03-01 RX ADMIN — POTASSIUM CHLORIDE SCH MLS/HR: 200 INJECTION, SOLUTION INTRAVENOUS at 10:52

## 2023-03-01 RX ADMIN — Medication SCH ML: at 13:25

## 2023-03-01 RX ADMIN — INSULIN ASPART SCH UNIT: 100 INJECTION, SOLUTION INTRAVENOUS; SUBCUTANEOUS at 22:01

## 2023-03-01 RX ADMIN — Medication SCH ML: at 06:00

## 2023-03-01 NOTE — ONCOLOGY CONSULTATION
Visit Information


Visit Information


Date of Admission


Feb 28, 2023 at 20:12


Attending Physician


Von Bravo MD


Admitting Physician


Admitting Physician:


Isabelle Dias MD 








Attending Physician:


Isabelle Dias MD


Chief Complaint


We are called to evaluate and management of thrombocytopenia and bilateral tiny 

pulmonary emboli (PE).


Interval History


Ms Camarillo is an 78 year old white female admitted to ICU 2/28/23 for pre-syncope

and chest pain and also found to have UTI. She was also found to have WBC 24 and

Plt 66 and CTA showed bilateral tiny PE. She stated that she has frequent UTI 

lately and was never told that she has blood counts problems. 


She is now treated with IV Rocephin and eliquis. No fever.


I consulted the patient on:


3/1/23


 15:30


Time Seen by Provider:  15:30





Review of Systems


Constitutional:  see HPI





Health Status


Allergies


Coded Allergies:  


     No Known Drug Allergies (Unverified , 2/28/23)





Home Medications


Aspirin (Aspirin EC) 81 Mg Tablet.dr, 81 MG PO HS, (Reported)


Atorvastatin Calcium (Atorvastatin Calcium) 20 Mg Tablet, 10 MG PO HS, 

(Reported)


   TAKES  OF A 20MG TAB LAST FILLED 10- #90/90 DAY SUPPLY 


Carvedilol (Carvedilol) 6.25 Mg Tablet, 6.25 MG PO BID, (Reported)


Cholecalciferol (Vitamin D3) (Vitamin D3) 125 Mcg (5000 Unit) Capsule, 125 MCG 

PO DAILY, (Reported)


Cranberry Extract (Cranberry) 250 Mg Capsule, 250 MG PO DAILY, (Reported)


Dutasteride (Dutasteride) 0.5 Mg Capsule, 0.5 MG PO DAILY, (Reported)


Fenofibrate Nanocrystallized (Fenofibrate) 145 Mg Tablet, 145 MG PO DAILY, 

(Reported)


   LAST FILLED 10- #90/90 DAY SUPPLY 


Metformin HCl (Metformin HCl ER) 750 Mg Tab.er.24h, 750 MG PO BID WITH MEALS, 

(Reported)


Methenamine Hippurate (Methenamine Hippurate) 1 Gram Tablet, 1 GM PO BID, 

(Reported)


Minoxidil (Minoxidil) 2.5 Mg Tablet, 2.5 MG PO DAILY, (Reported)


Trandolapril (Trandolapril) 4 Mg Tablet, 4 MG PO DAILY, (Reported)


   LAST FILLED 10- #90/90 DAY SUPPLY 


Verapamil HCl (Verapamil Sr) 240 Mg Cap24h.pel, 240 MG PO DAILY, (Reported)





PMH-Social-Family Hx


Patient Social History


Smoking Status:  Never a Smoker


Alcohol Use?:  No


Have you traveled recently?:  No





Immunizations Up To Date


Date of Influenza Vaccine:  Oct 15, 2022





Physical Exam


Vital Signs





Vital Signs - First Documented








 2/28/23 2/28/23





 15:30 21:19


 


Temp 36.5 


 


Pulse 93 


 


Resp 21 


 


B/P (MAP) 200/90 (126) 


 


Pulse Ox 96 


 


O2 Delivery Room Air 


 


FiO2  21





Capillary Refill : Less Than 3 Seconds


Height, Weight, BMI


Height: '"


Weight: lbs. oz. kg; 28.82 BMI


Method:


General Appearance:  No Apparent Distress


HEENT:  PERRL/EOMI


Respiratory:  No Accessory Muscle Use, No Respiratory Distress


Gastrointestinal:  Non Tender, Soft


Extremity:  Non Tender, No Calf Tenderness, No Pedal Edema


Neurologic/Psychiatric:  Alert, Oriented x3





Data Review


Labs


Laboratory Tests


3/1/23 04:46








Laboratory Tests


2/28/23 15:35: 


White Blood Count 24.1H, Red Cell Distribution Width 14.9H, Platelet Count 62L, 

Neutrophils (%) (Auto) 5L, Lymphocytes (%) (Auto) 48H, Monocytes (%) (Auto) 44H,

Neutrophils # (Auto) 1.3L, Lymphocytes # (Auto) 11.6H, Monocytes # (Auto) 10.7H,

Immature Granulocyte # (Auto) 0.5H, D-Dimer > 20.00H, Potassium Level 3.4L, 

Anion Gap 16H, Blood Urea Nitrogen 24H, Glucose Level 152H, Troponin I 0.72*H


2/28/23 15:36: Glucometer 140H


2/28/23 15:50: 


2/28/23 16:00: 


2/28/23 17:05: 


Urine Specific Gravity 1.010L, Urine Protein 1+H, Urine Nitrite POSITIVEH, Urine

Leukocyte Esterase TRACEH, Urine RBC (Auto) 1+H, Urine Bacteria MODERATEH


2/28/23 17:55: Troponin I 1.04*H


3/1/23 01:50: Troponin I 1.696*H


3/1/23 04:46: 


White Blood Count 21.3H, Red Cell Distribution Width 14.9H, Platelet Count 58L, 

Neutrophils (%) (Auto) 8L, Monocytes (%) (Auto) 58H, Lymphocytes # (Auto) 6.6H, 

Monocytes # (Auto) 12.4H, Immature Granulocyte # (Auto) 0.4H, Prothrombin Time 

15.9H, Activated Partial Thromboplast Time 36H, Potassium Level 3.1L, Chloride 

Level 108H, Carbon Dioxide Level 20L, Glucose Level 128H








Impression & Plan


Impression & Plan


UA showed +nitro and red cells c/w UTI





IMP:


1. UTI, recurrent


2. Leukocytosis and thrombocytopenia, 


3. Bilateral tiny PE, minimal symptoms, which is most likely secondary.


4. Chest pain, ? cardiac cause.


Rec:


1. Treat the UTI as you are doing for now. Closely monitoring her counts. 


2. Treat her PE with half dose of eliquis due to her thrombocytopenia and 

infection. 


3. hem-path review the slide. 


I will f/u with you. Thank you for the consultation.











MAYITO HAMMER MD               Mar 1, 2023 15:36

## 2023-03-01 NOTE — HISTORY & PHYSICAL
HPI


History of Present Illness:


77 yo female presented to ER after she had incoordination of right hand and left

arm pain concerning for stroke. She notes that at the end of last week she began

to feel generally unwell, with vague symptoms, but severe enough that she spent 

a lot of time lying around in bed. Yesterday she had a mammogram scheduled, so 

she went to that and to the store, but then her  noted that she was off, 

she was knocking cups over with her right hand and not grasping where she wanted

to. She called her regular doctor who told them to go to the ER. She feels her 

hand incoordination persists but is milder. She denies chest pain or shortness 

of breath, but has had right arm pain. She denies history of stroke or MI. She 

does take baby aspirin daily. She has recurrent UTIs and was to see Urology at 

 today for follow up, after they started her on maintenance antibiotic, 

cranberry in December. She thought she had been doing okay as far as UTI before 

she came in, but notes she was told in ER she has bad UTI again. This morning 

she says she feels about the same as she has the last few days.


Source:  patient


Date seen by provider:  Mar 1, 2023


Time Seen by Provider:  09:50


Attending Physician


Von Bravo MD


PCP


Admitting Physician:


Shannan Carrera MD 








Attending Physician:


Shannan Carrera MD


Consult





Date of Admission


2023 at 20:12





Home Medications


Home Medications


Reviewed patient Home Medication Reconciliation performed by pharmacy medication

reconciliations technician and/or nursing.


Patients Allergies have been reviewed.





Allergies


Coded Allergies:  


     No Known Drug Allergies (Unverified , 23)





PMH-Social-Family Hx


Patient Social History


Smoking Status:  Never a Smoker


Alcohol Use?:  No


Have you traveled recently?:  No





Immunizations Up To Date


Influenza Vaccine Up-to-Date:  Yes; Up-to-Date


COVID19 Vaccine :  MODERNA





Past Medical History





PMHx:


HTN


Recurrent UTI


DMII


HLD





SurgHx:


Tonsillectomy


D and C


Cataract surgery





Family Medical History


Significant Family History:  No Pertinent Family Hx





Review of Systems ([facility])


Constitutional:  No fever; malaise


Respiratory:  No cough, No short of breath


Cardiovascular:  No chest pain


Gastrointestinal:  No abdominal pain, No constipation, No diarrhea, No nausea, 

No vomiting


Genitourinary:  No dysuria


Musculoskeletal:  joint pain


Skin:  No rash





Reviewed Test Results


Reviewed Test Results


Lab





Laboratory Tests








Test


 23


15:35 23


15:36 23


15:50 23


16:00 Range/Units


 


 


White Blood Count


 24.1 H


 


 


 


 4.3-11.0


10^3/uL


 


Red Blood Count


 4.37 


 


 


 


 3.80-5.11


10^6/uL


 


Hemoglobin 12.3     11.5-16.0  g/dL


 


Hematocrit 37     35-52  %


 


Mean Corpuscular Volume 86     80-99  fL


 


Mean Corpuscular Hemoglobin 28     25-34  pg


 


Mean Corpuscular Hemoglobin


Concent 33 


 


 


 


 32-36  g/dL





 


Red Cell Distribution Width 14.9 H    10.0-14.5  %


 


Platelet Count


 62 L


 


 


 


 130-400


10^3/uL


 


Mean Platelet Volume 10.3     9.0-12.2  fL


 


Immature Granulocyte % (Auto) 2      %


 


Neutrophils (%) (Auto) 5 L    42-75  %


 


Lymphocytes (%) (Auto) 48 H    12-44  %


 


Monocytes (%) (Auto) 44 H    0-12  %


 


Eosinophils (%) (Auto) 0     0-10  %


 


Basophils (%) (Auto) 0     0-10  %


 


Neutrophils # (Auto)


 1.3 L


 


 


 


 1.8-7.8


10^3/uL


 


Lymphocytes # (Auto)


 11.6 H


 


 


 


 1.0-4.0


10^3/uL


 


Monocytes # (Auto)


 10.7 H


 


 


 


 0.0-1.0


10^3/uL


 


Eosinophils # (Auto)


 0.0 


 


 


 


 0.0-0.3


10^3/uL


 


Basophils # (Auto)


 0.1 


 


 


 


 0.0-0.1


10^3/uL


 


Immature Granulocyte # (Auto)


 0.5 H


 


 


 


 0.0-0.1


10^3/uL


 


Neutrophils % (Manual) 4      %


 


Lymphocytes % (Manual) 70      %


 


Monocytes % (Manual) 15      %


 


Eosinophils % (Manual) 0      %


 


Basophils % (Manual) 1      %


 


Metamyelocytes % 1      %


 


Band Neutrophils 1      %


 


Nucleated Red Blood Cells 1      


 


Atypical Lymphocytes 8      %


 


Prothrombin Time 14.7     12.2-14.7  SEC


 


INR Comment 1.1     0.8-1.4  


 


Activated Partial


Thromboplast Time 28 


 


 


 


 24-35  SEC





 


D-Dimer


 > 20.00 H


 


 


 


 0.00-0.49


UG/ML


 


Sodium Level 141     135-145  MMOL/L


 


Potassium Level 3.4 L    3.6-5.0  MMOL/L


 


Chloride Level 102       MMOL/L


 


Carbon Dioxide Level 23     21-32  MMOL/L


 


Anion Gap 16 H    5-14  MMOL/L


 


Blood Urea Nitrogen 24 H    7-18  MG/DL


 


Creatinine


 1.27 


 


 


 


 0.60-1.30


MG/DL


 


Estimat Glomerular Filtration


Rate 43 


 


 


 


  





 


BUN/Creatinine Ratio 19      


 


Glucose Level 152 H      MG/DL


 


Calcium Level 9.5     8.5-10.1  MG/DL


 


Corrected Calcium 9.1     8.5-10.1  MG/DL


 


Magnesium Level 1.7     1.6-2.4  MG/DL


 


Total Bilirubin 0.3     0.1-1.0  MG/DL


 


Aspartate Amino Transf


(AST/SGOT) 22 


 


 


 


 5-34  U/L





 


Alanine Aminotransferase


(ALT/SGPT) 12 


 


 


 


 0-55  U/L





 


Alkaline Phosphatase 82       U/L


 


Troponin I 0.72 *H    <0.30  NG/ML


 


Total Protein 7.8     6.4-8.2  GM/DL


 


Albumin 4.5     3.2-4.5  GM/DL


 


Glucometer  140 H     MG/DL


 


Lactic Acid Level


 


 


 0.93 


 


 0.50-2.00


MMOL/L


 


Influenza Type A (RT-PCR)    Not Detected  Not Detecte  


 


Influenza Type B (RT-PCR)    Not Detected  Not Detecte  


 


SARS-CoV-2 RNA (RT-PCR)    Not Detected  Not Detecte  


 


Test


 23


17:05 23


17:55 3/1/23


01:50 3/1/23


04:46 Range/Units


 


 


Urine Color YELLOW      


 


Urine Clarity SL CLOUDY      


 


Urine pH 5.5     5-9  


 


Urine Specific Gravity 1.010 L    1.016-1.022  


 


Urine Protein 1+ H    NEGATIVE  


 


Urine Glucose (UA) NEGATIVE     NEGATIVE  


 


Urine Ketones NEGATIVE     NEGATIVE  


 


Urine Nitrite POSITIVE H    NEGATIVE  


 


Urine Bilirubin NEGATIVE     NEGATIVE  


 


Urine Urobilinogen 0.2     < = 1.0  MG/DL


 


Urine Leukocyte Esterase TRACE H    NEGATIVE  


 


Urine RBC (Auto) 1+ H    NEGATIVE  


 


Urine RBC NONE      /HPF


 


Urine WBC 2-5      /HPF


 


Urine Squamous Epithelial


Cells 5-10 


 


 


 


  /HPF





 


Urine Crystals NONE      /LPF


 


Urine Bacteria MODERATE H     /HPF


 


Urine Casts NONE      /LPF


 


Urine Mucus NEGATIVE      /LPF


 


Urine Culture Indicated YES      


 


Troponin I  1.04 *H 1.696 *H  <0.028  NG/ML


 


White Blood Count


 


 


 


 21.3 H


 4.3-11.0


10^3/uL


 


Red Blood Count


 


 


 


 4.16 


 3.80-5.11


10^6/uL


 


Hemoglobin    11.8  11.5-16.0  g/dL


 


Hematocrit    36  35-52  %


 


Mean Corpuscular Volume    86  80-99  fL


 


Mean Corpuscular Hemoglobin    28  25-34  pg


 


Mean Corpuscular Hemoglobin


Concent 


 


 


 33 


 32-36  g/dL





 


Red Cell Distribution Width    14.9 H 10.0-14.5  %


 


Platelet Count


 


 


 


 58 L


 130-400


10^3/uL


 


Mean Platelet Volume    9.5  9.0-12.2  fL


 


Immature Granulocyte % (Auto)    2   %


 


Neutrophils (%) (Auto)    8 L 42-75  %


 


Lymphocytes (%) (Auto)    31  12-44  %


 


Monocytes (%) (Auto)    58 H 0-12  %


 


Eosinophils (%) (Auto)    0  0-10  %


 


Basophils (%) (Auto)    0  0-10  %


 


Neutrophils # (Auto)


 


 


 


 1.8 


 1.8-7.8


10^3/uL


 


Lymphocytes # (Auto)


 


 


 


 6.6 H


 1.0-4.0


10^3/uL


 


Monocytes # (Auto)


 


 


 


 12.4 H


 0.0-1.0


10^3/uL


 


Eosinophils # (Auto)


 


 


 


 0.0 


 0.0-0.3


10^3/uL


 


Basophils # (Auto)


 


 


 


 0.1 


 0.0-0.1


10^3/uL


 


Immature Granulocyte # (Auto)


 


 


 


 0.4 H


 0.0-0.1


10^3/uL


 


Neutrophils % (Manual)    4   %


 


Lymphocytes % (Manual)    29   %


 


Monocytes % (Manual)    6   %


 


Metamyelocytes %    3   %


 


Band Neutrophils    1   %


 


Nucleated Red Blood Cells    3   


 


Atypical Lymphocytes       %


 


Blast Cells    57   %


 


Platelet Estimate    DECREASED   


 


Clumped Platelets    NONE SEEN   


 


Percent Immature Platelet


Fraction 


 


 


 2.3 


 0.0-7.6  %





 


Poikilocytosis    SLIGHT   


 


Vadim Cells    SLIGHT   


 


Absolute Reticulocyte Count    36  24-90  10e9/uL


 


Percent Reticulocyte Count    0.87  0.50-2.40  %


 


Prothrombin Time    15.9 H 12.2-14.7  SEC


 


INR Comment    1.2  0.8-1.4  


 


Activated Partial


Thromboplast Time 


 


 


 36 H


 24-35  SEC





 


Sodium Level    142  135-145  MMOL/L


 


Potassium Level    3.1 L 3.6-5.0  MMOL/L


 


Chloride Level    108 H   MMOL/L


 


Carbon Dioxide Level    20 L 21-32  MMOL/L


 


Anion Gap    14  5-14  MMOL/L


 


Blood Urea Nitrogen    14  7-18  MG/DL


 


Creatinine


 


 


 


 0.77 


 0.60-1.30


MG/DL


 


Estimat Glomerular Filtration


Rate 


 


 


 78 


  





 


BUN/Creatinine Ratio    18   


 


Glucose Level    128 H   MG/DL


 


Calcium Level    8.8  8.5-10.1  MG/DL








Radiology


NAME:   AD KRUEGER


[facility] REC#:   [id number]


ACCOUNT#:   [id number]


PT STATUS:   REG ER


:   1945


PHYSICIAN:   JULES REHMAN MD


ADMIT DATE:   23/ER FS


***Signed***


Date of Exam:23





CT HEAD WO-R/O STROKE








PROCEDURE: CT head wo r/o stroke.





TECHNIQUE: Multiple contiguous axial images were obtained through


the brain without the use of intravenous contrast. Auto Exposure


Controls were utilized during the CT exam to meet ALARA standards


for radiation dose reduction.





INDICATION: Blurred vision and weakness.





COMPARISON: No prior studies are available for comparison.





FINDINGS: Ventricles and sulci are within normal limits. No


sulcal effacement or midline shift is identified. No acute


intra-axial or extra-axial hemorrhage is detected. Cisterns are


patent. Visualized paranasal sinuses are clear.





IMPRESSION: No acute intracranial process is detected.





Results were discussed with Dr. Rehman at 04:03 p.m.





Dictated by: 





  Dictated on workstation # [id number]








Dict:   23 1601


Trans:   23 1836


AS6 [id number]





Interpreted by:     POONAM ROMAN MD


Electronically signed by: POONAM ROMAN MD 23 1836


NAME:   AD KRUEGER


[facility] REC#:   [id number]


ACCOUNT#:   [id number]


PT STATUS:   REG ER


:   1945


PHYSICIAN:   JULES REHMAN MD


ADMIT DATE:   23/ER FS


***Signed***


Date of Exam:23





CHEST 1 VIEW AP/PA ONLY








INDICATION: Right upper extremity weakness





COMPARISON: None available. 





TECHNIQUE: Single radiograph of the chest dated 2023.





FINDINGS:  The cardiac silhouette is within normal limits in


size. No significant pulmonary vascular congestion. Senescent


changes of the lungs without focal pulmonary opacity. No


significant pleural effusion. No pneumothorax. No acute osseous


abnormality.





IMPRESSION: Senescent changes of the lungs without superimposed


acute cardiopulmonary abnormality.





Dictated by: 





  Dictated on workstation # [id number]








Dict:   23 1620


Trans:   23


CV [id number]





Interpreted by:     VIOLETTE KHOURY MD


Electronically signed by: VIOLETTE KHOURY MD 23


NAME:   AD KRUEGER


[facility] REC#:   [id number]


ACCOUNT#:   [id number]


PT STATUS:   REG ER


:   1945


PHYSICIAN:   JULES REHMAN MD


ADMIT DATE:   23/ER FS


***Signed***


Date of Exam:23





CT ANGIO HEAD/NECK








PROCEDURE: CT angiography of the head and CT angiography of the


neck with and without contrast.





TECHNIQUE: Contiguous noncontrast images were obtained from the


skull base through the vertex. After intravenous contrast


administration, helical CT angiography of the neck was performed.


Source data was reformatted into 3D MIP projections. Delayed post


contrast acquisition was also obtained. Auto Exposure Controls


were utilized during the CT exam to meet ALARA standards for


radiation dose reduction. 





INDICATION: Right-sided weakness.





CTA NECK:





There is a three-vessel branching pattern to the aortic arch. The


right and left common carotid arteries are widely patent. No


focal stenosis is identified. Both carotid bifurcations are


unremarkable and show no focal stenosis. The right and left


internal carotid arteries appear to be widely patent. There is


some calcified plaque at the carotid siphons bilaterally. The


left vertebral artery is dominant. Both vertebral arteries are


widely patent.





CTA HEAD:





The basilar artery is widely patent. The right posterior cerebral


artery is hypoplastic consistent with fetal origin. The right


posterior communicating artery feeds the right PCA. The left PCA


is widely patent. The right and left anterior cerebral arteries


are widely patent. The M1 and M2 branches of the right and left


middle cervical arteries appear to be widely patent. No


thromboembolism is identified. No large vessel occlusion is


detected.





Note is made of multiple low-attenuation nodules throughout both


lobes of the thyroid gland.





IMPRESSION:


1. Essentially unremarkable CT angiogram of the head and neck. No


thromboembolism or large vessel occlusion is identified. 


2. Bilateral thyroid nodules. This could be evaluated with


ultrasound on a nonemergent basis if not already performed.





Dictated by: 





  Dictated on workstation # [id number]








Dict:   23 1705


Trans:   23


[facility] [id number]





Interpreted by:     POONAM ROMAN MD


Electronically signed by: POONAM ROMAN MD 23


NAME:   AD KRUEGER


[facility] REC#:   [id number]


ACCOUNT#:   [id number]


PT STATUS:   REG ER


:   1945


PHYSICIAN:   JULES REHMAN MD


ADMIT DATE:   23/ER FS


***Signed***


Date of Exam:23





CT ANGIO CHEST W








PROCEDURE: CT angiography of the chest with contrast.





TECHNIQUE: Multiple contiguous axial images were obtained through


the chest after uneventful bolus administration of intravenous


contrast. 3D reconstructed CTA MIP acquisitions were also


performed. Auto Exposure Controls were utilized during the CT


exam to meet ALARA standards for radiation dose reduction. 





INDICATION: Elevated D-dimer.





No prior studies are available for comparison.





FINDINGS: Findings are positive for pulmonary emboli. There are


some filling defects involving a right upper lobe lobar pulmonary


arterial branch as well as right lower lobe lobar and segmental


branches. There is a tiny filling defect in a left lower lobe


segmental branch. Upper lobes are unremarkable. Overall clot


burden is very small. No definite findings of right heart failure


are detected. Thoracic aorta is normal in caliber. There is no


dissection. There is no pericardial or pleural fluid identified.


Lungs appear to be clear. No infiltrates are seen. There is no


nodule or mass. The upper abdomen does show low-attenuation


within the right lobe of the liver, suggestive of a cyst.





IMPRESSION:


1. Findings consistent with bilateral pulmonary emboli. Overall


clot burden is very small. There is no evidence of right heart


failure.





Dictated by: 





  Dictated on workstation # [id number]








Dict:   23 1738


Trans:   23


 [id number]





Interpreted by:     POONAM ROMAN MD


Electronically signed by: POONAM ROMAN MD 23 1836





Physical Exam-([facility])


Physical Exam


Vital Signs





                          VS - Last 72 Hours, by Label








 23





 15:30 19:26 20:23 20:28


 


Temp 36.5  36.9 


 


Pulse 93 71 70 75


 


Resp 21 18 23 


 


B/P (MAP) 200/90 (126) 162/79 143/90 (125) 


 


Pulse Ox 96 97 94 


 


O2 Delivery Room Air Room Air Room Air 


 


    





 23





 20:30 20:45 21:00 21:15


 


Pulse 71 68 64 68


 


Resp 19 19 17 11


 


B/P (MAP) 143/70 (101) 139/77 (82) 134/59 (109) 147/68 (85)


 


Pulse Ox 95 93 93 94


 


O2 Delivery Room Air Room Air Room Air Room Air





 23





 21:18 21:19 21:30 22:00


 


Pulse   68 64


 


Resp   12 21


 


B/P (MAP)   139/84 (125) 151/81 (116)


 


Pulse Ox 97 97 94 94


 


O2 Delivery Room Air  Room Air Room Air


 


FiO2  21  





 2/28/23 3/1/23 3/1/23 3/1/23





 23:00 00:00 00:00 01:00


 


Temp  37.0  


 


Pulse 70  69 71


 


Resp 18  16 


 


B/P (MAP) 159/80 (114)  151/78 (110) 


 


Pulse Ox 92  94 


 


O2 Delivery Room Air  Room Air 


 


    





 3/1/23 3/1/23 3/1/23 3/1/23





 04:00 06:15 07:12 07:39


 


Temp    37.0


 


Pulse 72  78 85


 


Resp 16   18


 


B/P (MAP) 158/72 (100)   145/77 (99)


 


Pulse Ox 94 97  96


 


O2 Delivery Room Air Room Air  Room Air


 


    





 3/1/23 3/1/23 3/1/23 3/1/23





 08:00 12:00 13:25 14:00


 


Temp  36.8  


 


Pulse  85 92 


 


Resp  14  


 


B/P (MAP)  153/89 (110)  


 


Pulse Ox 96 95  96


 


O2 Delivery Room Air Room Air  Room Air


 


    





 3/1/23   





 15:42   


 


Temp 36.6   


 


Pulse 75   


 


Resp 23   


 


B/P (MAP) 159/82 (107)   


 


O2 Delivery Nasal Cannula   





Capillary Refill : Less Than 3 Seconds


General Appearance:  WD/WN, no apparent distress


Eyes:  Bilateral Eye PERRL, Bilateral Eye EOMI


HEENT:  pharynx normal


Respiratory:  lungs clear, normal breath sounds


Cardiovascular:  regular rate, rhythm, no murmur


Gastrointestinal:  normal bowel sounds, non tender, soft


Extremities:  no pedal edema


Neurologic/Psychiatric:  CNs II-XII nml as tested, alert, normal mood/affect, 

oriented x 3; No abnormal cerebellar tests, No facial droop; motor weakness (4/5

strength in right arm abduction, elbow flexion and )


Skin:  normal color, warm/dry





Assessment/Plan


Assessment/Plan


Admission Status:  Observation





(1) Bilateral pulmonary embolism


Status:  Acute


Assessment & Plan:  Unclear etiology, Hematology consulted. Started treatment 

dose enoxaparin initially, will change to Eliquis.





(2) Thrombocytopenia


Status:  Acute


Assessment & Plan:  Uncertain etiology. Check peripheral smear, consult 

Hematology, particularly given need to be on anticoagulation and antiplatelet.





(3) Acute cystitis with hematuria


Status:  Acute


Assessment & Plan:  Ceftriaxone.





(4) TIA (transient ischemic attack)


Status:  Acute


Assessment & Plan:  Suspect possible TIA, does have some faint right arm 

weakness compared to left still. On aspirin, statin. CTA head/neck without 

occlusion. CT head without acute findings.





(5) Leukocytosis


Status:  Acute


Assessment & Plan:  Possibly secondary to UTI, but without other signs of 

sepsis. Peripheral smear being done for thrombocytopenia.





(6) Elevated troponin


Status:  Acute


Assessment & Plan:  EKG without ischemic changes. Cardiology consulted, 

appreciate recommendations.





(7) Hypokalemia


Status:  Acute


Assessment & Plan:  Replace and follow





(8) HTN (hypertension)


Status:  Chronic


Assessment & Plan:  Resume home meds.





(9) HLD (hyperlipidemia)


Status:  Chronic


Assessment & Plan:  Resume home statin.





(10) Diabetes mellitus


Status:  Chronic


Assessment & Plan:  Hold metformin inpatient. Sliding scale insulin.


Qualifiers:  


   


(11) Thyroid nodule


Status:  Acute


Assessment & Plan:  Incidental on CTA head/neck, Consider thyroid US outpatient.














SHANNAN CARRERA MD              Mar 1, 2023 19:19

## 2023-03-01 NOTE — DIAGNOSTIC IMAGING REPORT
PROCEDURE: US Venous Lower Ext Scar.



TECHNIQUE: Multiple real-time grayscale images were obtained over

the lower extremities in various projections, bilaterally.

Additional duplex Doppler and color Doppler images were also

obtained.



INDICATION: Pulmonary embolism.



FINDINGS: There is no evidence of right or left lower extremity

DVT. Both lower extremity deep venous systems demonstrate normal

compressibility with normal response to augmentation and

Valsalva. No fluid collection or mass is detected.



IMPRESSION: No evidence of right or left lower extremity DVT.



Dictated by: 



  Dictated on workstation # [id number]

## 2023-03-01 NOTE — CONSULTATION-CARDIOLOGY
HPI-Cardiology


Cardiology Consultation:


Date of Consultation


3/1/23


Time Seen by a Provider:  08:30


Date of Admission





Attending Physician


Von Bravo MD


Admitting Physician


Admitting Physician:


Isabelle Dias MD 








Attending Physician:


Isabelle Dias MD


Consulting Physician


FARZAD HICKEY MD, MA, FACP, FACC, FSCAI, CCDS





Physician requesting consult: Dr Dias





HPI:


Chief Complaint:


Elevated troponin


PE


Ms. Camarillo is an 78 yr old female who has been admitted to [facility] from [facility]

with bilat PE.  She reports on Friday of last week she had taken her  to 

[place] for pulmonary rehab and once they arrived home she felt nauseated and as 

though she would pass out.  She went and laid down and symptoms resolved after 

resting.  No further c/o nausea.  No c/o vomiting or diarrhea.  She reports she 

did have some pressure in her chest which she describes as mild.  She states it 

did not change with activity and has been constant since Sunday.  She reports on

Sunday morning she went to Religious and when she got home she felt generally 

unwell; reporting weakness, fatigue, gen malaise.  She reports she went to bed 

and rested for a few hours, got up and tried to do a few things.  She states she

continued to feel generally unwell and fatigued so she went back to bed.  She 

woke up Monday morning still feeling weak and fatigued, she reports she slept 

most of the day.  She denies any SOB or palpitations.  She reports on Tuesday 

she got up and went to have her routing mammogram.  She states she went to 

[facility] and when she returned home she again felt weak.  She reports dizziness. 

She states around 1300 yesterday she would try to reach for the counter top or 

an object and was "missing" the object knocking things over at home.  She 

reports that is when she came to the ED.  No report of LE swelling.  No c/o any 

falls.  She reports the pressure in her chest has eased up to slight heaviness. 

She reports she has sleep apnea, but d/t freq urination during the night she is 

not able to use her CPAP.  No report of syncope.  No fever or chills.





Review of Systems-Cardiology


Review of Systems


Constitutional:  As described under HPI; No chills, No fever


Eyes:  As described under HPI


Ears/Nose/Throat:  No epistaxis, No recent hearing loss


Respiratory:  As described under HPI


Cardiovascular:  As described under HPI


Gastrointestinal:  As described under HPI


Genitourinary:  No dysuria; hematuria (reports chronic - follows with urology)


Musculoskeletal:  no symptoms reported


Skin:  No rash on exposed areas, No ulcerations on exposed areas


Psychiatric/Neurological:  No anxiety, No depression, No seizure, No focal 

weakness, No syncope


Hematologic:  No bleeding abnormalities





PMH-Social-Family Hx


Patient Social History


Smoking Status:  Never a Smoker


Have you traveled recently?:  No


Alcohol Use?:  No


Pt feels they are or have been:  No





Immunizations Up To Date


Date of Influenza Vaccine:  Oct 15, 2022





Past Medical History


PMH


As described under Assessment.





Family Medical History


Family Medical History:  


She reports her mother had breast cancer.


She reports her grand-mother had "heart trouble"


No reported family h/o CVA





Allergies and Home Medications


Allergies


Coded Allergies:  


     No Known Drug Allergies (Unverified , 2/28/23)





Patient Home Medication List


Home Medication List Reviewed:  Yes





Physical Exam-Cardiology


Physical Exam


Vital Signs/I&O











 2/28/23 2/28/23 3/1/23 3/1/23





 22:00 23:00 00:00 00:00


 


Temp   37.0 


 


Pulse 64 70  69


 


Resp 21 18  16


 


B/P (MAP) 151/81 (116) 159/80 (114)  151/78 (110)


 


Pulse Ox 94 92  94


 


O2 Delivery Room Air Room Air  Room Air


 


    





 3/1/23 3/1/23 3/1/23 3/1/23





 01:00 04:00 06:15 07:12


 


Pulse 71 72  78


 


Resp  16  


 


B/P (MAP)  158/72 (100)  


 


Pulse Ox  94 97 


 


O2 Delivery  Room Air Room Air 





 3/1/23 3/1/23  





 07:39 08:00  


 


Temp 37.0   


 


Pulse 85   


 


Resp 18   


 


B/P (MAP) 145/77 (99)   


 


Pulse Ox 96 96  


 


O2 Delivery Room Air Room Air  














 3/1/23





 00:00


 


Intake Total 1130 ml


 


Balance 1130 ml





Capillary Refill : Less Than 3 Seconds


Constitutional:  AAO x 3, well-developed, well-nourished


HEENT:  PERRL, hearing is well preserved, oral hygience is good


Neck:  No carotid bruit; carotid pulses are 2 + bilaterally


Respiratory:  No accessory muscle use, No respiratory distress; chest expansion 

is symmetric, chest is bilaterally symmetric, lungs clear to auscultation


Cardiovascular:  regular rate-rhythm; No JVD; S1 and S2


Gastrointestinal:  No tender; soft, round; No guarding; audible bowel sounds


Extremities:  no lower extremity edema bilateral


Neurologic/Psychiatric:  grossly intact (moves all extremities)


Skin:  normal color, warm/dry; No rash on exposed areas, No ulcerations on 

exposed areas





Data Review


Labs


Laboratory Tests


2/28/23 15:35: 


White Blood Count 24.1H, Red Blood Count 4.37, Hemoglobin 12.3, Hematocrit 37, 

Mean Corpuscular Volume 86, Mean Corpuscular Hemoglobin 28, Mean Corpuscular 

Hemoglobin Concent 33, Red Cell Distribution Width 14.9H, Platelet Count 62L, 

Mean Platelet Volume 10.3, Immature Granulocyte % (Auto) 2, Neutrophils (%) 

(Auto) 5L, Lymphocytes (%) (Auto) 48H, Monocytes (%) (Auto) 44H, Eosinophils (%)

(Auto) 0, Basophils (%) (Auto) 0, Neutrophils # (Auto) 1.3L, Lymphocytes # 

(Auto) 11.6H, Monocytes # (Auto) 10.7H, Eosinophils # (Auto) 0.0, Basophils # 

(Auto) 0.1, Immature Granulocyte # (Auto) 0.5H, Neutrophils % (Manual) 4, 

Lymphocytes % (Manual) 70, Monocytes % (Manual) 15, Eosinophils % (Manual) 0, 

Basophils % (Manual) 1, Metamyelocytes % 1, Band Neutrophils 1, Nucleated Red 

Blood Cells 1, Atypical Lymphocytes 8, Prothrombin Time 14.7, INR Comment 1.1, 

Activated Partial Thromboplast Time 28, D-Dimer > 20.00H, Sodium Level 141, 

Potassium Level 3.4L, Chloride Level 102, Carbon Dioxide Level 23, Anion Gap 16H

, Blood Urea Nitrogen 24H, Creatinine 1.27, Estimat Glomerular Filtration Rate 

43, BUN/Creatinine Ratio 19, Glucose Level 152H, Calcium Level 9.5, Corrected 

Calcium 9.1, Magnesium Level 1.7, Total Bilirubin 0.3, Aspartate Amino Transf 

(AST/SGOT) 22, Alanine Aminotransferase (ALT/SGPT) 12, Alkaline Phosphatase 82, 

Troponin I 0.72*H, Total Protein 7.8, Albumin 4.5


2/28/23 15:36: Glucometer 140H


2/28/23 15:50: Lactic Acid Level 0.93


2/28/23 16:00: 


Influenza Type A (RT-PCR) Not Detected, Influenza Type B (RT-PCR) Not Detected, 

SARS-CoV-2 RNA (RT-PCR) Not Detected


2/28/23 17:05: 


Urine Color YELLOW, Urine Clarity SL CLOUDY, Urine pH 5.5, Urine Specific 

Gravity 1.010L, Urine Protein 1+H, Urine Glucose (UA) NEGATIVE, Urine Ketones 

NEGATIVE, Urine Nitrite POSITIVEH, Urine Bilirubin NEGATIVE, Urine Urobilinogen 

0.2, Urine Leukocyte Esterase TRACEH, Urine RBC (Auto) 1+H, Urine RBC NONE, 

Urine WBC 2-5, Urine Squamous Epithelial Cells 5-10, Urine Crystals NONE, Urine 

Bacteria MODERATEH, Urine Casts NONE, Urine Mucus NEGATIVE, Urine Culture 

Indicated YES


2/28/23 17:55: Troponin I 1.04*H


3/1/23 01:50: Troponin I 1.696*H


3/1/23 04:46: 


White Blood Count 21.3H, Red Blood Count 4.16, Hemoglobin 11.8, Hematocrit 36, 

Mean Corpuscular Volume 86, Mean Corpuscular Hemoglobin 28, Mean Corpuscular 

Hemoglobin Concent 33, Red Cell Distribution Width 14.9H, Platelet Count 58L, 

Mean Platelet Volume 9.5, Immature Granulocyte % (Auto) 2, Neutrophils (%) 

(Auto) 8L, Lymphocytes (%) (Auto) 31, Monocytes (%) (Auto) 58H, Eosinophils (%) 

(Auto) 0, Basophils (%) (Auto) 0, Neutrophils # (Auto) 1.8, Lymphocytes # (Auto)

6.6H, Monocytes # (Auto) 12.4H, Eosinophils # (Auto) 0.0, Basophils # (Auto) 

0.1, Immature Granulocyte # (Auto) 0.4H, Percent Immature Platelet Fraction 2.3,

Prothrombin Time 15.9H, INR Comment 1.2, Activated Partial Thromboplast Time 36H

, Sodium Level 142, Potassium Level 3.1L, Chloride Level 108H, Carbon Dioxide 

Level 20L, Anion Gap 14, Blood Urea Nitrogen 14, Creatinine 0.77, Estimat 

Glomerular Filtration Rate 78, BUN/Creatinine Ratio 18, Glucose Level 128H, 

Calcium Level 8.8








A/P-Cardiology


Assessment/Admission Diagnosis


NSTEMI vs Type 2 MI secondary to pulmonary embolism





Bilat PE


- CTA of the chesst on 2-28-23:  Findings consistent with bilateral pulmonary 

emboli. Overall clot burden is very small. There is no evidence of right heart 

failure





?TIA


- monitor on tele - consider ILR implant 





Leukocytosis


- management per medical services





Thrombocytopenia


- undetermined etiology


- advise consideration of hematology/oncology consult





Thyroid nodules seen on CTA of the neck on 2-28-23


- advise f/u as an out pt





KATELYN


- non-compliant with CPAP





HTN


- Coreg and Verapamil at home





HLD


- Lipitor at home - followed by her PCP





DM 2


- management per medical services





Discussion and Recomendations


Bilat PE seen on CTA of the chest of 2-28-23


- complex management issue - she needs to be on anticoagulation d/t PE, however,

she also has thrombocytopenia (new finding) - we advise OAC with Eliquis and 

consult with hematology/oncology services


- advise echocardiogram to eval structure and function


- Venous duplex today


NSTEMI vs Type 2 MI d/t PE


Leukocytosis


- undetermined etiology - management per medical services


HTN


- continue home medications of Coreg and Verapamil


KATELYN


- advise compliance with CPAP


HLD


- advis continuation of home dose of Lipitor


Thyroid nodules seen on CTA of the neck


- management per medical services


?TIA


- continue telemetry to monitor for arrhythmia - may need to consider ILR 

placement as out pt


Monitor lab closely


Replace electrolytes as indicated


Further recs will be based on her hospital course


We have spoken with her attending, Dr. Dias, regarding all of the above issues


We would like to thank her for this consult











FARZAD HICKEY MD FACP FAC CCDS    Mar 1, 2023 09:35

## 2023-03-01 NOTE — CONSULTATION-CARDIOLOGY
HPI-Cardiology


Cardiology Consultation:


Date of Consultation


3/1/23


Time Seen by a Provider:  08:10


Date of Admission


23


Attending Physician


Von Bravo MD


Admitting Physician


Admitting Physician:


Isabelle Dias MD 








Attending Physician:


Isabelle Dias MD


Consulting Physician


Liliana Feng MD





HPI:


Chief Complaint:


Elevated troponin


PE


Ms. Krueger is an 78 yr old female who has been admitted to [facility] from [facility]

with bilat PE.  She reports on Friday of last week she had taken her  to 

[place] for pulmonary rehab and once they arrived home she felt nauseated and as 

though she would pass out.  She went and laid down and symptoms resolved after 

resting.  No further c/o nausea.  No c/o vomiting or diarrhea.  She reports she 

did have some pressure in her chest which she describes as mild.  She states it 

did not change with activity and has been constant since .  She reports on

 morning she went to [employer] and when she got home she felt generally 

unwell; reporting weakness, fatigue, gen malaise.  She reports she went to bed 

and rested for a few hours, got up and tried to do a few things.  She states she

continued to feel generally unwell and fatigued so she went back to bed.  She wo

ke up Monday morning still feeling weak and fatigued, she reports she slept most

of the day.  She denies any SOB or palpitations.  She reports on Tuesday she got

up and went to have her routing mammogram.  She states she went to [facility] and 

when she returned home she again felt weak.  She reports dizziness.  She states 

around 1300 yesterday she would try to reach for the counter top or an object 

and was "missing" the object knocking things over at home.  She reports that is 

when she came to the ED.  No report of LE swelling.  No c/o any falls.  She 

reports the pressure in her chest has eased up to slight heaviness.  She reports

she has sleep apnea, but d/t freq urination during the night she is not able to 

use her CPAP.  No report of syncope.  No fever or chills.





Review of Systems-Cardiology


Review of Systems


Constitutional:  As described under HPI; No chills, No fever


Eyes:  As described under HPI


Ears/Nose/Throat:  No epistaxis, No recent hearing loss


Respiratory:  As described under HPI


Cardiovascular:  As described under HPI


Gastrointestinal:  As described under HPI


Genitourinary:  No dysuria; hematuria (reports chronic - follows with urology)


Musculoskeletal:  no symptoms reported


Skin:  No rash on exposed areas, No ulcerations on exposed areas


Psychiatric/Neurological:  No anxiety, No depression, No seizure, No focal 

weakness, No syncope


Hematologic:  No bleeding abnormalities





PMH-Social-Family Hx


Patient Social History


Smoking Status:  Never a Smoker


Have you traveled recently?:  No


Alcohol Use?:  No


Pt feels they are or have been:  No





Immunizations Up To Date


Date of Influenza Vaccine:  Oct 15, 2022





Past Medical History


PMH


As described under Assessment.





Family Medical History


Family Medical History:  


She reports her mother had breast cancer.


She reports her grand-mother had "heart trouble"


No reported family h/o CVA





Allergies and Home Medications


Allergies


Coded Allergies:  


     No Known Drug Allergies (Unverified , 23)





Patient Home Medication List


Aspirin (Aspirin EC) 81 Mg Tablet.dr, 81 MG PO HS, (Reported)


   Entered as Reported by: NETTA RIDDLE on 3/1/23 1228


   Last Action: Reviewed


Atorvastatin Calcium (Atorvastatin Calcium) 20 Mg Tablet, 10 MG PO HS, 

(Reported)


   Entered as Reported by: NETTA RIDDLE on 3/1/23 1228


   Last Action: Continued


Carvedilol (Carvedilol) 6.25 Mg Tablet, 6.25 MG PO BID, (Reported)


   Entered as Reported by: NETTA RIDDLE on 3/1/23 1228


   Last Action: Reviewed


Cholecalciferol (Vitamin D3) (Vitamin D3) 125 Mcg (5000 Unit) Capsule, 125 MCG 

PO DAILY, (Reported)


   Entered as Reported by: NETTA RIDDLE on 3/1/23 1228


   Last Action: Reviewed


Cranberry Extract (Cranberry) 250 Mg Capsule, 250 MG PO DAILY, (Reported)


   Entered as Reported by: NETTA RIDDLE on 3/1/23 1228


   Last Action: Reviewed


Dutasteride (Dutasteride) 0.5 Mg Capsule, 0.5 MG PO DAILY, (Reported)


   Entered as Reported by: NETTA RIDDLE on 3/1/23 1228


   Last Action: Converted


Fenofibrate Nanocrystallized (Fenofibrate) 145 Mg Tablet, 145 MG PO DAILY, 

(Reported)


   Entered as Reported by: NETTA RIDDLE on 3/1/23 1228


   Last Action: Converted


Metformin HCl (Metformin HCl ER) 750 Mg Tab.er.24h, 750 MG PO BID WITH MEALS, 

(Reported)


   Entered as Reported by: NETTA RIDDLE on 3/1/23 1228


   Last Action: Held


Methenamine Hippurate (Methenamine Hippurate) 1 Gram Tablet, 1 GM PO BID, 

(Reported)


   Entered as Reported by: NETTA RIDDLE on 3/1/23 1228


   Last Action: Continued


Minoxidil (Minoxidil) 2.5 Mg Tablet, 2.5 MG PO DAILY, (Reported)


   Entered as Reported by: NETTA RIDDLE on 3/1/23 1228


   Last Action: Converted


Trandolapril (Trandolapril) 4 Mg Tablet, 4 MG PO DAILY, (Reported)


   Entered as Reported by: NETTA RIDDLE on 3/1/23 1228


   Last Action: Converted


Verapamil HCl (Verapamil Sr) 240 Mg Cap24h.pel, 240 MG PO DAILY, (Reported)


   Entered as Reported by: NETTA RIDDLE on 3/1/23 1228


   Last Action: Reviewed


Discontinued Medications


Aspirin (Aspirin) 81 Mg Tab.chew, 81 MG PO DAILY


   Discontinued Reason: Duplicate Order


   Prescribed by: ERNIE GONZALEZ on 3/1/23 1124


   Last Action: Discontinued


Atorvastatin Calcium (Atorvastatin Calcium) 20 Mg Tablet, 10 MG PO DAILY


   Discontinued Reason: Duplicate Order


   Prescribed by: ERNIE GONZALEZ on 3/1/23 1124


   Last Action: Discontinued


Carvedilol (Coreg) 6.25 Mg Tablet, 6.25 MG PO BID


   Discontinued Reason: Duplicate Order


   Prescribed by: ERNIE GONZALEZ on 3/1/23 1109


   Last Action: Discontinued


Cholecalciferol (Vitamin D3) (Vitamin D3) 125 Mcg (5000 Unit) Capsule, 125 MCG 

PO DAILY


   Discontinued Reason: Duplicate Order


   Prescribed by: ERNIE GONZALEZ on 3/1/23 1124


   Last Action: Discontinued


Cranberry Extract/Vit C (Azo Cranberry Softgel) 250 Mg-60 Mg Capsule, 1 EACH PO 

DAILY


   Discontinued Reason: Duplicate Order


   Prescribed by: ERNIE GONZALEZ on 3/1/23 1124


   Last Action: Discontinued


Dutasteride (Dutasteride) 0.5 Mg Capsule, 0.5 MG PO DAILY


   Discontinued Reason: Duplicate Order


   Prescribed by: ERNIE GONZALEZ on 3/1/23 1124


   Last Action: Discontinued


Fenofibrate Nanocrystallized (Fenofibrate) 145 Mg Tablet, 145 MG PO DAILY


   Discontinued Reason: Duplicate Order


   Prescribed by: ERNIE GONZALEZ on 3/1/23 1124


   Last Action: Discontinued


Metformin HCl (Metformin HCl ER) 750 Mg Tab.er.24h, 750 MG PO BID


   Discontinued Reason: Duplicate Order


   Prescribed by: ERNIE GONZALEZ on 3/1/23 1124


   Last Action: Discontinued


Methenamine Hippurate (Methenamine Hippurate) 1 Gram Tablet, 1 GM PO BID


   Discontinued Reason: Duplicate Order


   Prescribed by: ERNIE GONZALEZ on 3/1/23 1124


   Last Action: Discontinued


Minoxidil (Minoxidil) 2.5 Mg Tablet, 2.5 MG PO DAILY


   Discontinued Reason: Duplicate Order


   Prescribed by: ERNIE GNOZALEZ on 3/1/23 1124


   Last Action: Discontinued


Trandolapril (Trandolapril) 4 Mg Tablet, 4 MG PO DAILY


   Discontinued Reason: Duplicate Order


   Prescribed by: ERNIE GONZALEZ on 3/1/23 1124


   Last Action: Discontinued


Verapamil HCl (Verapamil Sr) 240 Mg Cap24h.pel, 240 MG PO DAILY


   Discontinued Reason: Duplicate Order


   Prescribed by: ERNIE GONZALEZ on 3/1/23 1124


   Last Action: Discontinued





Physical Exam-Cardiology


Physical Exam


Vital Signs/I&O











 3/1/23 3/2/23 3/2/23 3/2/23





 23:39 00:00 00:34 01:00


 


Temp 36.3   


 


Pulse  73  73


 


Resp  22  


 


B/P (MAP)  142/93 (109)  


 


Pulse Ox  93 97 


 


O2 Delivery  Room Air Room Air 


 


    





 3/2/23 3/2/23 3/2/23 3/2/23





 04:00 07:00 07:56 08:00


 


Temp   37.1 


 


Pulse 74 74 77 


 


Resp 21  18 


 


B/P (MAP) 133/117 (122)  142/81 (101) 


 


Pulse Ox 92  96 96


 


O2 Delivery Room Air  Room Air Room Air














 3/2/23





 00:00


 


Intake Total 550 ml


 


Output Total 0 ml


 


Balance 550 ml





Capillary Refill : Less Than 3 Seconds


Constitutional:  AAO x 3, well-developed, well-nourished


HEENT:  PERRL, hearing is well preserved, oral hygience is good


Neck:  No carotid bruit; carotid pulses are 2 + bilaterally


Respiratory:  No accessory muscle use, No respiratory distress; chest expansion 

is symmetric, chest is bilaterally symmetric, lungs clear to auscultation


Cardiovascular:  regular rate-rhythm; No JVD; S1 and S2


Gastrointestinal:  No tender; soft, round; No guarding; audible bowel sounds


Extremities:  no lower extremity edema bilateral


Neurologic/Psychiatric:  grossly intact (moves all extremities)


Skin:  normal color, warm/dry; No rash on exposed areas, No ulcerations on 

exposed areas





Data Review


Labs


Laboratory Tests


3/1/23 21:51: Glucometer 164H


3/2/23 05:36: 


White Blood Count 25.9H, Red Blood Count 4.39, Hemoglobin 12.7, Hematocrit 37, 

Mean Corpuscular Volume 84, Mean Corpuscular Hemoglobin 29, Mean Corpuscular 

Hemoglobin Concent 34, Red Cell Distribution Width 14.8H, Platelet Count 50L, 

Mean Platelet Volume 10.1, Sodium Level 140, Potassium Level 3.5L, Chloride 

Level 105, Carbon Dioxide Level 20L, Anion Gap 15H, Blood Urea Nitrogen 12, 

Creatinine 0.79, Estimat Glomerular Filtration Rate 76, BUN/Creatinine Ratio 15,

Glucose Level 156H, Calcium Level 8.7





Microbiology


23 Urine Culture - Preliminary, Resulted


          Gram Negative Manoj








Radiology


NAME:   AD KRUEGER


[facility] REC#:   [id number]


ACCOUNT#:   [id number]


PT STATUS:   REG ER


:   1945


PHYSICIAN:   JULES REHMAN MD


ADMIT DATE:   23/ER FS


***Signed***


Date of Exam:23





CT HEAD WO-R/O STROKE








PROCEDURE: CT head wo r/o stroke.





TECHNIQUE: Multiple contiguous axial images were obtained through


the brain without the use of intravenous contrast. Auto Exposure


Controls were utilized during the CT exam to meet ALARA standards


for radiation dose reduction.





INDICATION: Blurred vision and weakness.





COMPARISON: No prior studies are available for comparison.





FINDINGS: Ventricles and sulci are within normal limits. No


sulcal effacement or midline shift is identified. No acute


intra-axial or extra-axial hemorrhage is detected. Cisterns are


patent. Visualized paranasal sinuses are clear.





IMPRESSION: No acute intracranial process is detected.





Results were discussed with Dr. Rehman at 04:03 p.m.





Dictated by: 





  Dictated on workstation # [id number]








Dict:   23 1601


Trans:   23 1836


[id number] [id number]





Interpreted by:     POONAM ROMAN MD


Electronically signed by: POONAM ROMAN MD 23 1836


NAME:   AD KRUEGER


[facility] REC#:   [id number]


ACCOUNT#:   [id number]


PT STATUS:   REG ER


:   1945


PHYSICIAN:   JULES REHMAN MD


ADMIT DATE:   23/ER FS


***Signed***


Date of Exam:23





CHEST 1 VIEW AP/PA ONLY








INDICATION: Right upper extremity weakness





COMPARISON: None available. 





TECHNIQUE: Single radiograph of the chest dated 2023.





FINDINGS:  The cardiac silhouette is within normal limits in


size. No significant pulmonary vascular congestion. Senescent


changes of the lungs without focal pulmonary opacity. No


significant pleural effusion. No pneumothorax. No acute osseous


abnormality.





IMPRESSION: Senescent changes of the lungs without superimposed


acute cardiopulmonary abnormality.





Dictated by: 





  Dictated on workstation # [id number]








Dict:   23 1620


Trans:   23 1743


[facility] [phone]





Interpreted by:     VIOLETTE KHOURY MD


Electronically signed by: VIOLETTE KHOURY MD 23 1743


NAME:   AD KRUEGER


[facility] REC#:   [id number]


ACCOUNT#:   [id number]


PT STATUS:   REG ER


:   1945


PHYSICIAN:   JULES REHMAN MD


ADMIT DATE:   23/ER FS


***Signed***


Date of Exam:23





CT ANGIO HEAD/NECK








PROCEDURE: CT angiography of the head and CT angiography of the


neck with and without contrast.





TECHNIQUE: Contiguous noncontrast images were obtained from the


skull base through the vertex. After intravenous contrast


administration, helical CT angiography of the neck was performed.


Source data was reformatted into 3D MIP projections. Delayed post


contrast acquisition was also obtained. Auto Exposure Controls


were utilized during the CT exam to meet ALARA standards for


radiation dose reduction. 





INDICATION: Right-sided weakness.





CTA NECK:





There is a three-vessel branching pattern to the aortic arch. The


right and left common carotid arteries are widely patent. No


focal stenosis is identified. Both carotid bifurcations are


unremarkable and show no focal stenosis. The right and left


internal carotid arteries appear to be widely patent. There is


some calcified plaque at the carotid siphons bilaterally. The


left vertebral artery is dominant. Both vertebral arteries are


widely patent.





CTA HEAD:





The basilar artery is widely patent. The right posterior cerebral


artery is hypoplastic consistent with fetal origin. The right


posterior communicating artery feeds the right PCA. The left PCA


is widely patent. The right and left anterior cerebral arteries


are widely patent. The M1 and M2 branches of the right and left


middle cervical arteries appear to be widely patent. No


thromboembolism is identified. No large vessel occlusion is


detected.





Note is made of multiple low-attenuation nodules throughout both


lobes of the thyroid gland.





IMPRESSION:


1. Essentially unremarkable CT angiogram of the head and neck. No


thromboembolism or large vessel occlusion is identified. 


2. Bilateral thyroid nodules. This could be evaluated with


ultrasound on a nonemergent basis if not already performed.





Dictated by: 





  Dictated on workstation # [id number]








Dict:   23 1705


Trans:   23


AC [id number]





Interpreted by:     POONAM ROMAN MD


Electronically signed by: POONAM ROMAN MD 23


NAME:   AD KRUEGER


[facility] REC#:   [id number]


ACCOUNT#:   [id number]


PT STATUS:   REG ER


:   1945


PHYSICIAN:   JULES REHMAN MD


ADMIT DATE:   23/ER FS


***Signed***


Date of Exam:23





CT ANGIO CHEST W








PROCEDURE: CT angiography of the chest with contrast.





TECHNIQUE: Multiple contiguous axial images were obtained through


the chest after uneventful bolus administration of intravenous


contrast. 3D reconstructed CTA MIP acquisitions were also


performed. Auto Exposure Controls were utilized during the CT


exam to meet ALARA standards for radiation dose reduction. 





INDICATION: Elevated D-dimer.





No prior studies are available for comparison.





FINDINGS: Findings are positive for pulmonary emboli. There are


some filling defects involving a right upper lobe lobar pulmonary


arterial branch as well as right lower lobe lobar and segmental


branches. There is a tiny filling defect in a left lower lobe


segmental branch. Upper lobes are unremarkable. Overall clot


burden is very small. No definite findings of right heart failure


are detected. Thoracic aorta is normal in caliber. There is no


dissection. There is no pericardial or pleural fluid identified.


Lungs appear to be clear. No infiltrates are seen. There is no


nodule or mass. The upper abdomen does show low-attenuation


within the right lobe of the liver, suggestive of a cyst.





IMPRESSION:


1. Findings consistent with bilateral pulmonary emboli. Overall


clot burden is very small. There is no evidence of right heart


failure.





Dictated by: 





  Dictated on workstation # [id number]








Dict:   23 1738


Trans:   236


 [id number]





Interpreted by:     POONAM ROMAN MD


Electronically signed by: POONAM ROMAN MD 23





A/P-Cardiology


Assessment/Admission Diagnosis


NSTEMI vs Type 2 MI secondary to pulmonary embolism





Bilat PE


- CTA of the chesst on 23:  Findings consistent with bilateral pulmonary 

emboli. Overall clot burden is very small. There is no evidence of right heart 

failure





?TIA


- monitor on tele - consider ILR implant 





Leukocytosis


- management per medical services





Thrombocytopenia


- undetermined etiology


- advise consideration of hematology/oncology consult





Thyroid nodules seen on CTA of the neck on 23


- advise f/u as an out pt





KATELYN


- non-compliant with CPAP





HTN


- Coreg and Verapamil at home





HLD


- Lipitor at home - followed by her PCP





DM 2


- management per medical services





Discussion and Recomendations


Bilat PE seen on CTA of the chest of 23


- complex management issue - she needs to be on anticoagulation d/t PE, however,

she also has thrombocytopenia (new finding) - we advise OAC with Eliquis and 

consult with hematology/oncology services


- advise echocardiogram to eval structure and function


- Venous duplex today


NSTEMI vs Type 2 MI d/t PE


Leukocytosis


- undetermined etiology - management per medical services


HTN


- continue home medications of Coreg and Verapamil


KATELYN


- advise compliance with CPAP


HLD


- advis continuation of home dose of Lipitor


Thyroid nodules seen on CTA of the neck


- management per medical services


?TIA


- continue telemetry to monitor for arrhythmia - may need to consider ILR 

placement as out pt


Monitor lab closely


Replace electrolytes as indicated


Further recs will be based on her hospital course


We have spoken with Dr. Dias of medical services


We would like to thank her for this consult











ELIER WALDRON            Mar 1, 2023 08:46

## 2023-03-02 VITALS — DIASTOLIC BLOOD PRESSURE: 117 MMHG | SYSTOLIC BLOOD PRESSURE: 133 MMHG

## 2023-03-02 VITALS — SYSTOLIC BLOOD PRESSURE: 148 MMHG | DIASTOLIC BLOOD PRESSURE: 82 MMHG

## 2023-03-02 VITALS — DIASTOLIC BLOOD PRESSURE: 93 MMHG | SYSTOLIC BLOOD PRESSURE: 142 MMHG

## 2023-03-02 VITALS — DIASTOLIC BLOOD PRESSURE: 81 MMHG | SYSTOLIC BLOOD PRESSURE: 142 MMHG

## 2023-03-02 LAB
BUN/CREAT SERPL: 15
CALCIUM SERPL-MCNC: 8.7 MG/DL (ref 8.5–10.1)
CHLORIDE SERPL-SCNC: 105 MMOL/L (ref 98–107)
CO2 SERPL-SCNC: 20 MMOL/L (ref 21–32)
CREAT SERPL-MCNC: 0.79 MG/DL (ref 0.6–1.3)
GFR SERPLBLD BASED ON 1.73 SQ M-ARVRAT: 76 ML/MIN
GLUCOSE SERPL-MCNC: 156 MG/DL (ref 70–105)
HCT VFR BLD CALC: 37 % (ref 35–52)
HGB BLD-MCNC: 12.7 G/DL (ref 11.5–16)
MCH RBC QN AUTO: 29 PG (ref 25–34)
MCHC RBC AUTO-ENTMCNC: 34 G/DL (ref 32–36)
MCV RBC AUTO: 84 FL (ref 80–99)
PLATELET # BLD: 50 10^3/UL (ref 130–400)
PMV BLD AUTO: 10.1 FL (ref 9–12.2)
POTASSIUM SERPL-SCNC: 3.5 MMOL/L (ref 3.6–5)
SODIUM SERPL-SCNC: 140 MMOL/L (ref 135–145)
WBC # BLD AUTO: 25.9 10^3/UL (ref 4.3–11)

## 2023-03-02 RX ADMIN — METHENAMINE HIPPURATE SCH GM: 1 TABLET ORAL at 08:39

## 2023-03-02 RX ADMIN — INSULIN ASPART SCH UNIT: 100 INJECTION, SOLUTION INTRAVENOUS; SUBCUTANEOUS at 06:24

## 2023-03-02 RX ADMIN — Medication SCH ML: at 05:43

## 2023-03-02 RX ADMIN — INSULIN ASPART SCH UNIT: 100 INJECTION, SOLUTION INTRAVENOUS; SUBCUTANEOUS at 12:43

## 2023-03-02 RX ADMIN — ASPIRIN SCH MG: 325 TABLET ORAL at 08:39

## 2023-03-02 RX ADMIN — CARVEDILOL SCH EA: 25 TABLET, FILM COATED ORAL at 08:40

## 2023-03-02 NOTE — PROGRESS NOTE - CARDIOLOGY
Cardiology SOAP Progress Note


Subjective:


Sitting up in recliner at the bedside


No c/o CP, SOB or palpitations


States she is transferring to [facility] today 


No c/o LE swelling


No c/o n/v/d





Objective:


I&O/Vital Signs











 3/1/23 3/2/23 3/2/23 3/2/23





 23:39 00:00 00:34 01:00


 


Temp 36.3   


 


Pulse  73  73


 


Resp  22  


 


B/P (MAP)  142/93 (109)  


 


Pulse Ox  93 97 


 


O2 Delivery  Room Air Room Air 


 


    





 3/2/23 3/2/23 3/2/23 3/2/23





 04:00 07:00 07:56 08:00


 


Temp   37.1 


 


Pulse 74 74 77 


 


Resp 21  18 


 


B/P (MAP) 133/117 (122)  142/81 (101) 


 


Pulse Ox 92  96 96


 


O2 Delivery Room Air  Room Air Room Air














 3/2/23





 00:00


 


Intake Total 550 ml


 


Output Total 0 ml


 


Balance 550 ml








Constitutional:  AAO x 3, well-developed, well-nourished


Respiratory:  No accessory muscle use, No respiratory distress; chest expansion 

is symmetric, chest is bilaterally symmetric, lungs clear to auscultation


Cardiovascular:  regular rate-rhythm; No JVD; S1 and S2


Gastrointestional:  No tender; soft, round; No guarding; audible bowel sounds


Extremities:  no lower extremity edema bilateral


Neurologic/Psychiatric:  grossly intact (moves all extremities)


Skin:  normal color, warm/dry, rash on exposed areas (petechial rash to arms 

bilat); No ulcerations on exposed areas





Results/Procedures:


Labs


Laboratory Tests


3/1/23 21:51: Glucometer 164H


3/2/23 05:36: 


White Blood Count 25.9H, Red Blood Count 4.39, Hemoglobin 12.7, Hematocrit 37, 

Mean Corpuscular Volume 84, Mean Corpuscular Hemoglobin 29, Mean Corpuscular 

Hemoglobin Concent 34, Red Cell Distribution Width 14.8H, Platelet Count 50L, 

Mean Platelet Volume 10.1, Sodium Level 140, Potassium Level 3.5L, Chloride 

Level 105, Carbon Dioxide Level 20L, Anion Gap 15H, Blood Urea Nitrogen 12, 

Creatinine 0.79, Estimat Glomerular Filtration Rate 76, BUN/Creatinine Ratio 15,

Glucose Level 156H, Calcium Level 8.7





Microbiology


2/28/23 Urine Culture - Preliminary, Resulted


          Gram Negative Manoj








A/P:


Assessment:


NSTEMI vs Type 2 MI secondary to pulmonary embolism





Bilat PE


- CTA of the chesst on 2-28-23:  Findings consistent with bilateral pulmonary 

emboli. Overall clot burden is very small. There is no evidence of right heart 

failure


- No evidence of DVT on venous duplex of 3-1-23





?TIA


- monitor on tele - no evidence of arrhythmia


- consider ILR implant as out pt





Leukocytosis


- UTI - management per medical services





Thrombocytopenia


- AML dx on 3-2-23 by Dr. Garza - plan is to transfer to [facility] 





Thyroid nodules seen on CTA of the neck on 2-28-23


- advise f/u as an out pt





KATELYN


- non-compliant with CPAP





HTN


- improved on home medication regimen





HLD


- continue Lipitor as per home regimen - followed by her PCP





DM 2


- management per medical services


Plan:


Complex management issue


Bilat PE seen on CTA of the chest of 2-28-23


- complex management issue - she needs to be on anticoagulation d/t PE, however,

she also has thrombocytopenia (worsening) with newly dx AML per Greg (new 

finding) 


Leukocytosis


- d/t UTI - management per medical services


HTN


- continue home medications of Coreg and Verapamil


KATELYN


- advise compliance with CPAP


HLD


- continue home dose of Lipitor


Thyroid nodules seen on CTA of the neck


- management per medical services


?TIA


- continue telemetry to monitor for arrhythmia 


- may need to consider ILR placement as out pt


Replace electrolytes











ELIER WALDRON            Mar 2, 2023 09:27

## 2023-03-02 NOTE — PROGRESS NOTE
BURNS,SHIRA LUCERO 3/2/23 1010:


Progress Note


CC: Limb incoordination 


HPI: 


Patient seen at bedside this AM. She is sitting up having breakfast in her 

recliner. She is anxious to leave the hospital and feels that she is better and 

her symptoms have resolved. She is peeing a lot and having BMs. She says she 

feels weak when walking in her room but feels it's because she isn't able to 

rest well when in the hospital. Her  is with her this AM at bedside. 





Hospital Course: 


This is an 77 y/o female with a PMH of recurrent UTIs treated by [facility], 

DMII, HLD, and HTN who presented on 2/28/23 for incoordination of her right hand

and coinciding left arm pain. The patient reported that she was unable to grasp 

glasses/utensils in her kitchen and would bump into them when trying to grab 

them which caused them to be knocked off counters/tables and to break and she 

couldn't correct/control this. She noted that at the end of last week she began 

to feel unwell with vague constitutional symptoms to the point that she spent 

most of her time lying around in bed. Upon presentation a CT w/o contrast ruled 

out an acute hemorrhagic stroke. She was also found to have thrombocytopenia, 

leukocytosis, and a repeat UTI with a HAGMA. The patient also complained of some

chest pain and had elevated troponins but EKG negative for STEMI. Imaging showed

small pulmonary emboli and cardiology was consulted to follow her for NSTEMI vs 

Type II MI. Heme/Onc was consulted on 3/1/23 and initiated evaluation for the 

unexplained thrombocytopenia in the setting of the lung emboli and resulting 

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 Heme/Onc reported these findings

to the patient on 3/2/23 and recommended transfer to [facility] for further management

of AML to which she was accepted and scheduled to leave the same day. 








SurgHx:





Tonsillectomy


D and C


Cataract surgery








Allergies: NKDA





Objective:





Vital Signs








  Date Time  Temp Pulse Resp B/P (MAP) Pulse Ox O2 Delivery O2 Flow Rate FiO2


 


3/2/23 08:00     96 Room Air  


 


3/2/23 07:56 37.1 77 18 142/81 (101) 96 Room Air  


 


3/2/23 07:00  74      


 


3/2/23 04:00  74 21 133/117 (122) 92 Room Air  


 


3/2/23 01:00  73      


 


3/2/23 00:34     97 Room Air  


 


3/2/23 00:00  73 22 142/93 (109) 93 Room Air  


 


3/1/23 23:39 36.3       


 


3/1/23 20:30      Room Air  


 


3/1/23 20:00  84 24 161/78 (105) 93 Room Air  


 


3/1/23 19:35 36.7 81 21 141/83 (102) 94 Nasal Cannula  


 


3/1/23 19:00  81      


 


3/1/23 15:42 36.6 75 23 159/82 (107)  Nasal Cannula  


 


3/1/23 14:00     96 Room Air  


 


3/1/23 13:25  92      


 


3/1/23 12:00 36.8 85 14 153/89 (110) 95 Room Air  














I & O 


 


 3/2/23





 07:00


 


Intake Total 600 ml


 


Output Total 0 ml


 


Balance 600 ml





Laboratory Tests


2/28/23 15:35: 


White Blood Count 24.1H, Red Cell Distribution Width 14.9H, Platelet Count 62L, 

Neutrophils (%) (Auto) 5L, Lymphocytes (%) (Auto) 48H, Monocytes (%) (Auto) 44H,

Neutrophils # (Auto) 1.3L, Lymphocytes # (Auto) 11.6H, Monocytes # (Auto) 10.7H,

Immature Granulocyte # (Auto) 0.5H, D-Dimer > 20.00H, Potassium Level 3.4L, 

Anion Gap 16H, Blood Urea Nitrogen 24H, Glucose Level 152H, Troponin I 0.72*H


2/28/23 15:36: Glucometer 140H


2/28/23 15:50: 


2/28/23 16:00: 


2/28/23 17:05: 


Urine Specific Gravity 1.010L, Urine Protein 1+H, Urine Nitrite POSITIVEH, Urine

Leukocyte Esterase TRACEH, Urine RBC (Auto) 1+H, Urine Bacteria MODERATEH


2/28/23 17:55: Troponin I 1.04*H


3/1/23 01:50: Troponin I 1.696*H


3/1/23 04:46: 


White Blood Count 21.3H, Red Cell Distribution Width 14.9H, Platelet Count 58L, 

Neutrophils (%) (Auto) 8L, Monocytes (%) (Auto) 58H, Lymphocytes # (Auto) 6.6H, 

Monocytes # (Auto) 12.4H, Immature Granulocyte # (Auto) 0.4H, Prothrombin Time 

15.9H, Activated Partial Thromboplast Time 36H, Potassium Level 3.1L, Chloride 

Level 108H, Carbon Dioxide Level 20L, Glucose Level 128H


3/1/23 21:51: Glucometer 164H


3/2/23 05:36: 


White Blood Count 25.9H, Red Cell Distribution Width 14.8H, Platelet Count 50L, 

Potassium Level 3.5L, Carbon Dioxide Level 20L, Anion Gap 15H, Glucose Level 

156H





Physical Exam:


General: Well-appearing, no acute distress, alert and oriented x3


CV: Normal S1/S2, Regular rate/rhythm


Lungs: Lungs CTAB, good air movement and chest rise


GI: Bowel sounds active, soft/nontender


Extremities: Peripheral pulses 2+/4, no edema


Neuro: CN II-XII intact


   Upper extremities: bilateral 5/5 strength on extension/flexion and shoulder 

shrug


      -Sensation is intact and equal bilaterally to crude/fine touch


      -Negative for dysdiadokinesis


      -Finger tip to nose testing reveals no dysmetria


   Lower extremities: Bilateral 5/5 strength on extension/flexion of hips/knees.

Sensation intact/equal bilaterally to crude/fine touch


Mental: Good spirits, appropriate affect








Assessment:


This is a 77 y/o female who presented to the ED for limb incoordination, limb 

pain, and general malaise now on HD#2





Acute Myeloblastic Leukemia


Type II MI vs NSTEMI


Elevated Troponin


HAGMA


Leukocytosis


Thrombocytopenia


Small B/L pulmonary emboli


Recurrent UTIs/Acute UTI


TIA





Plan:





1) Acute myeloblastic leukemia, thrombocytopenia, leukocytosis


- >70% blasts on peripheral smear


-Heme/Onc following, appreciate recs


   -Will initiate transfer to [facility] for specialized treatment





2) B/L pulmonary emboli


    Elevated Troponins


-Was started on eliquis, Heme/Onc advised half dose due to significant 

thrombocytopenia 


-PT/PTT elevated


-O2 sats 92% on RA, no respiratory difficulty at this time


-No Right heart strain/failure sx; Echo with 60-65% EF, pulm a. pressures 35-40 

mmHg no Rt heart dysfunction, mild LV diastolic dysfunction. 


-Elevated troponin - likely type II MI


-Cardiology following, appreciate recs





3) HAGMA likely 2/2 acute UTI


-GAP of 15, CO2 20


-Urine culture growing Gram neg rods (3/2) >100,000 cfu


-On rocephin q24hr through 3/5; denies dysuria/frequency/foul odor


-Continuing methenamine hippurate





4) TIA, limb ataxia/dysmetria


Likely 2/2 to paraneoplastic process of AML vs VTE


-No focal hemorrhage on imaging


-Symptoms have resolved; no focal findings on neurological assessment


-Pt had been on ASA, will continue 325mg daily.





DORINA CLAUDIO DO 3/3/23 0452:


Supervisory-Addendum Brief


Verification & Attestation


Participated in pt care:  history, MDM, physical


Personally performed:  exam, history, MDM, supervision of care


Care discussed with:  Medical Student


Procedures:  n/a


Results interpretation:  Verified all documentation


Verification and Attestation of Medical Student E/M Service





A medical student performed and documented this service in my presence. I 

reviewed and verified all information documented by the medical student and made

modifications to such information, when appropriate. I personally performed the 

physical exam and medical decision making. 





 Dorina Claudio, Mar 3, 2023,04:51











SHIRA HORTON                   Mar 2, 2023 10:10


DORINA CLAUDIO DO                 Mar 3, 2023 04:52

## 2023-03-02 NOTE — PROGRESS NOTE - CARDIOLOGY
Cardiology SOAP Progress Note


Subjective:


Notes some weakness, but better


No focal weakness


No cp or palp or syncope or shortness of breath


No n/v/d





Objective:


I&O/Vital Signs











 3/1/23 3/2/23 3/2/23 3/2/23





 23:39 00:00 00:34 01:00


 


Temp 36.3   


 


Pulse  73  73


 


Resp  22  


 


B/P (MAP)  142/93 (109)  


 


Pulse Ox  93 97 


 


O2 Delivery  Room Air Room Air 


 


    





 3/2/23 3/2/23 3/2/23 3/2/23





 04:00 07:00 07:56 08:00


 


Temp   37.1 


 


Pulse 74 74 77 


 


Resp 21  18 


 


B/P (MAP) 133/117 (122)  142/81 (101) 


 


Pulse Ox 92  96 96


 


O2 Delivery Room Air  Room Air Room Air














 3/2/23





 00:00


 


Intake Total 550 ml


 


Output Total 0 ml


 


Balance 550 ml








Constitutional:  AAO x 3, well-developed, well-nourished


Respiratory:  No accessory muscle use, No respiratory distress; chest expansion 

is symmetric, chest is bilaterally symmetric, lungs clear to auscultation


Cardiovascular:  regular rate-rhythm; No JVD; S1 and S2


Gastrointestional:  No tender; soft, round; No guarding; audible bowel sounds


Extremities:  no lower extremity edema bilateral


Neurologic/Psychiatric:  grossly intact (moves all extremities)


Skin:  normal color, warm/dry, rash on exposed areas (petechial rash to arms 

bilat); No ulcerations on exposed areas





Results/Procedures:


Labs


Laboratory Tests


3/1/23 21:51: Glucometer 164H


3/2/23 05:36: 


White Blood Count 25.9H, Red Blood Count 4.39, Hemoglobin 12.7, Hematocrit 37, 

Mean Corpuscular Volume 84, Mean Corpuscular Hemoglobin 29, Mean Corpuscular 

Hemoglobin Concent 34, Red Cell Distribution Width 14.8H, Platelet Count 50L, 

Mean Platelet Volume 10.1, Sodium Level 140, Potassium Level 3.5L, Chloride 

Level 105, Carbon Dioxide Level 20L, Anion Gap 15H, Blood Urea Nitrogen 12, 

Creatinine 0.79, Estimat Glomerular Filtration Rate 76, BUN/Creatinine Ratio 15,

Glucose Level 156H, Calcium Level 8.7





Microbiology


2/28/23 Urine Culture - Preliminary, Resulted


          Gram Negative Manoj





Laboratory Tests


2/28/23 15:35








3/1/23 04:46








3/2/23 05:36











A/P:


Assessment:











Bilat PE


- CTA of the chesst on 2-28-23:  Findings consistent with bilateral pulmonary 

emboli. Overall clot burden is very small. There is no evidence of right heart 

failure


- No evidence of DVT on venous duplex of 3-1-23


- Type 2 MI secondary to pulmonary embolism





?TIA


- monitor on tele - no evidence of arrhythmia


- consider ILR implant as out pt





Leucocytois and thrombocytopenia


- AML dx on 3-2-23 by Dr. Garza - plan is to transfer to [facility] 





UTI - managed by the [facility]





Thyroid nodules seen on CTA of the neck on 2-28-23


- managed by the [employer]





KATELYN


- non-compliant with CPAP





HTN


- improved on home medication regimen





HLD


- continue Lipitor as per home regimen - followed by her PCP





DM 2


- management per Medical services


Plan:





* Complex management due to multiple comorbidities


* Continue current regimen. Heme/Onc has stopped oral anticoag


* Agree with transfer to [facility]


* Replace electrolytes











FARZAD HICKEY MD FACP FACC CCDS    Mar 2, 2023 09:36

## 2023-03-02 NOTE — DISCHARGE SUMMARY
Discharge Summary


Hospital Course


Was the Problem List Reviewed?:  Yes


Problems/Dx:  


(1) AML (acute myeloblastic leukemia)


(2) TIA (transient ischemic attack)


Status:  Acute


Hospital Course


Date of Admission: Feb 28, 2023 at 20:12 


Admission Diagnosis :  





Family Physician/Provider: Von Bravo MD  





Date of Discharge: 3/2/23 


Discharge Diagnosis: [ ]








Hospital Course:


CC: Limb incoordination 


HPI: 


Patient seen at bedside this AM. She is sitting up having breakfast in her 

recliner. She is anxious to leave the hospital and feels that she is better and 

her symptoms have resolved. She is peeing a lot and having BMs. She says she 

feels weak when walking in her room but feels it's because she isn't able to 

rest well when in the hospital. Her  is with her this AM at bedside. 





Hospital Course: 


This is an 77 y/o female with a PMH of recurrent UTIs treated by [facility], 

DMII, HLD, and HTN who presented on 2/28/23 for incoordination of her right hand

and coinciding left arm pain. The patient reported that she was unable to grasp 

glasses/utensils in her kitchen and would bump into them when trying to grab 

them which caused them to be knocked off counters/tables and to break and she 

couldn't correct/control this. She noted that at the end of last week she began 

to feel unwell with vague constitutional symptoms to the point that she spent 

most of her time lying around in bed. Upon presentation a CT w/o contrast ruled 

out an acute hemorrhagic stroke. She was also found to have thrombocytopenia, 

leukocytosis, and a repeat UTI with a HAGMA. The patient also complained of some

chest pain and had elevated troponins but EKG negative for STEMI. Imaging showed

small pulmonary emboli and cardiology was consulted to follow her for NSTEMI vs 

Type II MI. Heme/Onc was consulted on 3/1/23 and initiated evaluation for the 

unexplained thrombocytopenia in the setting of the lung emboli and resulting 

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 Heme/Onc reported these findings

to the patient on 3/2/23 and recommended transfer to [facility] for further management

of AML to which she was accepted and scheduled to leave the same day. 








SurgHx:





Tonsillectomy


D and C


Cataract surgery








Allergies: NKDA





Objective:





Vital Signs








  Date Time  Temp Pulse Resp B/P (MAP) Pulse Ox O2 Delivery O2 Flow Rate FiO2


 


3/2/23 08:00     96 Room Air  


 


3/2/23 07:56 37.1 77 18 142/81 (101) 96 Room Air  


 


3/2/23 07:00  74      


 


3/2/23 04:00  74 21 133/117 (122) 92 Room Air  


 


3/2/23 01:00  73      


 


3/2/23 00:34     97 Room Air  


 


3/2/23 00:00  73 22 142/93 (109) 93 Room Air  


 


3/1/23 23:39 36.3       


 


3/1/23 20:30      Room Air  


 


3/1/23 20:00  84 24 161/78 (105) 93 Room Air  


 


3/1/23 19:35 36.7 81 21 141/83 (102) 94 Nasal Cannula  


 


3/1/23 19:00  81      


 


3/1/23 15:42 36.6 75 23 159/82 (107)  Nasal Cannula  


 


3/1/23 14:00     96 Room Air  


 


3/1/23 13:25  92      


 


3/1/23 12:00 36.8 85 14 153/89 (110) 95 Room Air  














I & O 


 


 3/2/23





 07:00


 


Intake Total 600 ml


 


Output Total 0 ml


 


Balance 600 ml





Laboratory Tests


2/28/23 15:35: 


White Blood Count 24.1H, Red Cell Distribution Width 14.9H, Platelet Count 62L, 

Neutrophils (%) (Auto) 5L, Lymphocytes (%) (Auto) 48H, Monocytes (%) (Auto) 44H,

Neutrophils # (Auto) 1.3L, Lymphocytes # (Auto) 11.6H, Monocytes # (Auto) 10.7H,

Immature Granulocyte # (Auto) 0.5H, D-Dimer > 20.00H, Potassium Level 3.4L, 

Anion Gap 16H, Blood Urea Nitrogen 24H, Glucose Level 152H, Troponin I 0.72*H


2/28/23 15:36: Glucometer 140H


2/28/23 15:50: 


2/28/23 16:00: 


2/28/23 17:05: 


Urine Specific Gravity 1.010L, Urine Protein 1+H, Urine Nitrite POSITIVEH, Urine

Leukocyte Esterase TRACEH, Urine RBC (Auto) 1+H, Urine Bacteria MODERATEH


2/28/23 17:55: Troponin I 1.04*H


3/1/23 01:50: Troponin I 1.696*H


3/1/23 04:46: 


White Blood Count 21.3H, Red Cell Distribution Width 14.9H, Platelet Count 58L, 

Neutrophils (%) (Auto) 8L, Monocytes (%) (Auto) 58H, Lymphocytes # (Auto) 6.6H, 

Monocytes # (Auto) 12.4H, Immature Granulocyte # (Auto) 0.4H, Prothrombin Time 

15.9H, Activated Partial Thromboplast Time 36H, Potassium Level 3.1L, Chloride 

Level 108H, Carbon Dioxide Level 20L, Glucose Level 128H


3/1/23 21:51: Glucometer 164H


3/2/23 05:36: 


White Blood Count 25.9H, Red Cell Distribution Width 14.8H, Platelet Count 50L, 

Potassium Level 3.5L, Carbon Dioxide Level 20L, Anion Gap 15H, Glucose Level 

156H





Physical Exam:


General: Well-appearing, no acute distress, alert and oriented x3


CV: Normal S1/S2, Regular rate/rhythm


Lungs: Lungs CTAB, good air movement and chest rise


GI: Bowel sounds active, soft/nontender


Extremities: Peripheral pulses 2+/4, no edema


Neuro: CN II-XII intact


   Upper extremities: bilateral 5/5 strength on extension/flexion and shoulder 

shrug


      -Sensation is intact and equal bilaterally to crude/fine touch


      -Negative for dysdiadokinesis


      -Finger tip to nose testing reveals no dysmetria


   Lower extremities: Bilateral 5/5 strength on extension/flexion of hips/knees.

Sensation intact/equal bilaterally to crude/fine touch


Mental: Good spirits, appropriate affect








Assessment:


This is a 77 y/o female who presented to the ED for limb incoordination, limb 

pain, and general malaise now on HD#2





Acute Myeloblastic Leukemia


Type II MI vs NSTEMI


Elevated Troponin


HAGMA


Leukocytosis


Thrombocytopenia


Small B/L pulmonary emboli


Recurrent UTIs/Acute UTI


TIA





Plan:





1) Acute myeloblastic leukemia, thrombocytopenia, leukocytosis


- >70% blasts on peripheral smear


-Heme/Onc following, appreciate recs


   -Will initiate transfer to [facility] for specialized treatment





2) B/L pulmonary emboli


    Elevated Troponins


-Was started on eliquis, Heme/Onc advised half dose due to significant 

thrombocytopenia 


-PT/PTT elevated


-O2 sats 92% on RA, no respiratory difficulty at this time


-No Right heart strain/failure sx; Echo with 60-65% EF, pulm a. pressures 35-40 

mmHg no Rt heart dysfunction, mild LV diastolic dysfunction. 


-Elevated troponin - likely type II MI


-Cardiology following, appreciate recs





3) HAGMA likely 2/2 acute UTI


-GAP of 15, CO2 20


-Urine culture growing Gram neg rods (3/2) >100,000 cfu


-On rocephin q24hr through 3/5; denies dysuria/frequency/foul odor


-Continuing methenamine hippurate





4) TIA, limb ataxia/dysmetria


Likely 2/2 to paraneoplastic process of AML vs VTE


-No focal hemorrhage on imaging


-Symptoms have resolved; no focal findings on neurological assessment


-Pt had been on ASA, will continue 325mg daily.














Labs and Pending Lab Test:


Laboratory Tests


3/1/23 21:51: Glucometer 164H


3/2/23 05:36: 


White Blood Count 25.9H, Red Blood Count 4.39, Hemoglobin 12.7, Hematocrit 37, 

Mean Corpuscular Volume 84, Mean Corpuscular Hemoglobin 29, Mean Corpuscular 

Hemoglobin Concent 34, Red Cell Distribution Width 14.8H, Platelet Count 50L, 

Mean Platelet Volume 10.1, Sodium Level 140, Potassium Level 3.5L, Chloride 

Level 105, Carbon Dioxide Level 20L, Anion Gap 15H, Blood Urea Nitrogen 12, 

Creatinine 0.79, Estimat Glomerular Filtration Rate 76, BUN/Creatinine Ratio 15,

Glucose Level 156H, Uric Acid 7.8H, Calcium Level 8.7


3/2/23 10:53: Glucometer 192H





Microbiology


2/28/23 Urine Culture - Preliminary, Resulted


          Gram Negative Manoj





Home Meds


Active


Reported


Minoxidil 2.5 Mg Tablet 2.5 Mg PO DAILY


Trandolapril 4 Mg Tablet 4 Mg PO DAILY


     LAST FILLED 10- #90/90 DAY SUPPLY


Atorvastatin Calcium 20 Mg Tablet 10 Mg PO HS


     TAKES  OF A 20MG TAB


     LAST FILLED 10- #90/90 DAY SUPPLY


Fenofibrate (Fenofibrate Nanocrystallized) 145 Mg Tablet 145 Mg PO DAILY


     LAST FILLED 10- #90/90 DAY SUPPLY


Vitamin D3 (Cholecalciferol (Vitamin D3)) 125 Mcg (5000 Unit) Capsule 125 Mcg PO

DAILY


Aspirin EC (Aspirin) 81 Mg Tablet.dr 81 Mg PO HS


Cranberry (Cranberry Extract) 250 Mg Capsule 250 Mg PO DAILY


Carvedilol 6.25 Mg Tablet 6.25 Mg PO BID


Verapamil Sr (Verapamil HCl) 240 Mg Cap24h.pel 240 Mg PO DAILY


Methenamine Hippurate 1 Gram Tablet 1 Gm PO BID


Metformin HCl ER (Metformin HCl) 750 Mg Tab.er.24h 750 Mg PO BID WITH MEALS


Dutasteride 0.5 Mg Capsule 0.5 Mg PO DAILY


Assessment/Pt Instructions


ku


Discharge Planning:  <30 minutes discharge planning





Discharge Physical Examination


Vital Signs





Vital Signs








  Date Time  Temp Pulse Resp B/P (MAP) Pulse Ox O2 Delivery O2 Flow Rate FiO2


 


3/2/23 12:00 36.6 76 16 148/82 (104) 96 Room Air  


 


2/28/23 21:19        21








General Appearance:  No Apparent Distress, WD/WN, Chronically ill


Allergies:  


Coded Allergies:  


     No Known Drug Allergies (Unverified , 2/28/23)





Discharge Summary


Date of Admission


Feb 28, 2023 at 20:12


Date of Discharge





Discharge Date:  Mar 2, 2023











DRAKE CLAUDIO DO                 Mar 2, 2023 12:27

## 2023-03-02 NOTE — ONCOLOGY PROGRESS NOTE
Subjective


Date Seen by a Provider:  Mar 2, 2023


Time Seen by a Provider:  09:02


Subjective/Events-last exam


Reviewed slide by hem-path last night 


Diagnosis: AML with 70% circulating blasts in the peripheral blood.


I discussed the diagnosis of AML with patient and her  this morning and 

recommend to transfer to [facility]. They agreed. They want to take private 

transportation.


Pt is currently stable and eating breakfast in her chair. 


All further work up will be at [facility].


I have initiated the call for the transfer.


CBC today: WBC 25, Hb 12, Plt 50.


Cr 0.79.


Stop eliquis.





Data Review


Labs








Physical Exam


Vital Signs





Vital Signs - First Documented








 3/2/23 3/2/23





 00:00 07:56


 


Temp  37.1


 


Pulse 73 


 


Resp 22 


 


B/P (MAP) 142/93 (109) 


 


Pulse Ox 93 


 


O2 Delivery Room Air 





Capillary Refill : Less Than 3 Seconds


Height, Weight, BMI


Height: '"


Weight: lbs. oz. kg; 28.51 BMI


Method:


General Appearance:  No Apparent Distress





Focused Exam


Lactate Level








Impression & Plan


Impression & Plan


A/P:


1. De-jose AML: Transfer to [facility], accepting physician Dr. Bush. Check uric acid 

here per Dr Bush request.











MAYITO HAMMER MD               Mar 2, 2023 09:07